# Patient Record
Sex: MALE | Race: WHITE | Employment: FULL TIME | URBAN - METROPOLITAN AREA
[De-identification: names, ages, dates, MRNs, and addresses within clinical notes are randomized per-mention and may not be internally consistent; named-entity substitution may affect disease eponyms.]

---

## 2017-04-27 ENCOUNTER — LAB ENCOUNTER (OUTPATIENT)
Dept: LAB | Age: 62
End: 2017-04-27
Attending: INTERNAL MEDICINE
Payer: COMMERCIAL

## 2017-04-27 ENCOUNTER — PRIOR ORIGINAL RECORDS (OUTPATIENT)
Dept: OTHER | Age: 62
End: 2017-04-27

## 2017-04-27 ENCOUNTER — TELEPHONE (OUTPATIENT)
Dept: INTERNAL MEDICINE CLINIC | Facility: CLINIC | Age: 62
End: 2017-04-27

## 2017-04-27 ENCOUNTER — OFFICE VISIT (OUTPATIENT)
Dept: INTERNAL MEDICINE CLINIC | Facility: CLINIC | Age: 62
End: 2017-04-27

## 2017-04-27 VITALS
BODY MASS INDEX: 30.78 KG/M2 | WEIGHT: 207.81 LBS | HEIGHT: 69 IN | TEMPERATURE: 98 F | HEART RATE: 90 BPM | OXYGEN SATURATION: 95 % | DIASTOLIC BLOOD PRESSURE: 82 MMHG | SYSTOLIC BLOOD PRESSURE: 118 MMHG

## 2017-04-27 DIAGNOSIS — I10 ESSENTIAL HYPERTENSION: ICD-10-CM

## 2017-04-27 DIAGNOSIS — R10.32 LEFT GROIN PAIN: Primary | ICD-10-CM

## 2017-04-27 DIAGNOSIS — Z00.00 ROUTINE HEALTH MAINTENANCE: ICD-10-CM

## 2017-04-27 PROCEDURE — 85025 COMPLETE CBC W/AUTO DIFF WBC: CPT

## 2017-04-27 PROCEDURE — 80061 LIPID PANEL: CPT

## 2017-04-27 PROCEDURE — 80048 BASIC METABOLIC PNL TOTAL CA: CPT

## 2017-04-27 PROCEDURE — 99212 OFFICE O/P EST SF 10 MIN: CPT | Performed by: INTERNAL MEDICINE

## 2017-04-27 PROCEDURE — 36415 COLL VENOUS BLD VENIPUNCTURE: CPT

## 2017-04-27 PROCEDURE — 81003 URINALYSIS AUTO W/O SCOPE: CPT

## 2017-04-27 PROCEDURE — 99214 OFFICE O/P EST MOD 30 MIN: CPT | Performed by: INTERNAL MEDICINE

## 2017-04-27 NOTE — PROGRESS NOTES
Magaly Russell is a 64year old male   HPI:   Pt.presents for the following problems. Patient for 2 months has been having some left upper inner medial thigh pain. Right distal to the groin area.   Seemed to start when he was rock climbing about 2 mo Social History:    Smoking Status: Never Smoker                      Smokeless Status: Never Used                        Alcohol Use: Yes           3.6 - 4.2 oz/week       6-7 Cans of beer per week          REVIEW OF SYSTEMS:   GENERAL:  feels well. hypertension  Update labs. - CBC W Differential W Platelet [E]; Future  - Basic Metabolic Panel (8) [E]; Future  - Lipid Panel [E]; Future  - Urinalysis, Routine [E]; Future    3. Routine health maintenance  Patient has history of colonic polyps.   Next co

## 2017-04-27 NOTE — TELEPHONE ENCOUNTER
Please let patient know that his labs came out acceptable. No changes for now. We will call him with results of his hip x-ray when available.

## 2017-04-27 NOTE — TELEPHONE ENCOUNTER
Called patient and Dr. Daysi Lara message relayed on cell voicemail per HIPAA. Instructed to call back if he had any questions.

## 2017-05-02 ENCOUNTER — HOSPITAL ENCOUNTER (OUTPATIENT)
Dept: GENERAL RADIOLOGY | Age: 62
Discharge: HOME | End: 2017-05-02
Attending: INTERNAL MEDICINE
Payer: COMMERCIAL

## 2017-05-02 ENCOUNTER — TELEPHONE (OUTPATIENT)
Dept: INTERNAL MEDICINE CLINIC | Facility: CLINIC | Age: 62
End: 2017-05-02

## 2017-05-02 DIAGNOSIS — R10.32 LEFT GROIN PAIN: ICD-10-CM

## 2017-05-02 PROCEDURE — 73502 X-RAY EXAM HIP UNI 2-3 VIEWS: CPT

## 2017-05-03 NOTE — TELEPHONE ENCOUNTER
Message relayed to patient who verbalized understanding. Stated he is not interested in seeing orthopedics as of now. Nothing further at this time.

## 2017-05-03 NOTE — TELEPHONE ENCOUNTER
Please let pt know his hip xray came out good. Just minimal arthritic changes not unusual and not causing any pain. He can see orthopedics to see what else can be done.

## 2017-05-09 ENCOUNTER — TELEPHONE (OUTPATIENT)
Dept: INTERNAL MEDICINE CLINIC | Facility: CLINIC | Age: 62
End: 2017-05-09

## 2017-05-09 DIAGNOSIS — R10.32 LEFT GROIN PAIN: Primary | ICD-10-CM

## 2017-05-09 NOTE — TELEPHONE ENCOUNTER
LMTCB   Please advise patient to call his insurance for providers , then  can then give him a referral

## 2017-05-09 NOTE — TELEPHONE ENCOUNTER
Pt called Dr Dmitry Lindquist he can't be seen until mid June, pt would like to get the names of other Orthopedics, please call [phone]      Tasked to nursing

## 2017-05-12 NOTE — TELEPHONE ENCOUNTER
Pt stopped in office with list of orthopedic physicians in [facility] for Dr Ravindra Beckwith review.   Pt asked that Dr Ravindra Beckwith please call with a recommendation   Fam Scherer  Pt ca

## 2017-06-07 ENCOUNTER — TELEPHONE (OUTPATIENT)
Dept: INTERNAL MEDICINE CLINIC | Facility: CLINIC | Age: 62
End: 2017-06-07

## 2017-06-07 NOTE — TELEPHONE ENCOUNTER
Patient has an appt on 6/15, which says 6 mo ck up as he had an appt in December. But patient was also seen on 4/27/17, so does he keep the June appt or do 6 months from April appt?

## 2017-06-08 NOTE — TELEPHONE ENCOUNTER
Called and left Dr. Maik Bautista message on patients cell phone. Notified that we will cancel appt for Jessica 15. Patient to return back in December and to call back to schedule that appointment.     To : please cancel patient appt on Jessica 15,2017, thank

## 2017-06-08 NOTE — TELEPHONE ENCOUNTER
Please notify patient that I think he can wait till December for our next visit since we just saw each other late April.

## 2017-06-27 RX ORDER — PRAVASTATIN SODIUM 20 MG
TABLET ORAL
Qty: 90 TABLET | Refills: 1 | Status: SHIPPED | OUTPATIENT
Start: 2017-06-27 | End: 2018-01-11

## 2017-09-04 RX ORDER — ENALAPRIL MALEATE 5 MG/1
TABLET ORAL
Qty: 90 TABLET | Refills: 3 | Status: SHIPPED | OUTPATIENT
Start: 2017-09-04 | End: 2017-11-28

## 2017-09-11 ENCOUNTER — TELEPHONE (OUTPATIENT)
Dept: INTERNAL MEDICINE CLINIC | Facility: CLINIC | Age: 62
End: 2017-09-11

## 2017-09-11 NOTE — TELEPHONE ENCOUNTER
Pt saw Dr. Emma Bernard but he is not in pt's insurance network [facility]). Pt needs an order for non-invasive MRI.     Tasked to Nursing

## 2017-09-11 NOTE — TELEPHONE ENCOUNTER
To Dr. Shaw Narvaez - pt did see Dr. Zina Burr 1-2 weeks ago - pt was very pleased with Dr. Zina Burr but he is not in pt's network. Pt would like to get the MRI L hip that Dr. Zina Burr suggested - pt asking if it would be more cost effective for you to order it?

## 2017-09-21 NOTE — TELEPHONE ENCOUNTER
Please notify patient that I did talk to .   He told me that I could order the MRI scan for him but then when I was thinking about it more it probably would be wise for him to connect with a new orthopedic physician as the new orthopedic physician m

## 2017-11-06 NOTE — TELEPHONE ENCOUNTER
Patient called back. He would like help finding a new orthopedic doctor. He did see Dr Jose Juan Munoz but did not care for him.  He is wondering if Dr Nicole Mccoy can recommend a doctor to him based on the list he has of those that are in his insurance plan:

## 2017-11-28 ENCOUNTER — OFFICE VISIT (OUTPATIENT)
Dept: INTERNAL MEDICINE CLINIC | Facility: CLINIC | Age: 62
End: 2017-11-28

## 2017-11-28 VITALS
OXYGEN SATURATION: 98 % | TEMPERATURE: 98 F | WEIGHT: 204.63 LBS | SYSTOLIC BLOOD PRESSURE: 140 MMHG | BODY MASS INDEX: 30.31 KG/M2 | DIASTOLIC BLOOD PRESSURE: 86 MMHG | HEIGHT: 69 IN | HEART RATE: 101 BPM

## 2017-11-28 DIAGNOSIS — E78.5 HYPERLIPIDEMIA, UNSPECIFIED HYPERLIPIDEMIA TYPE: ICD-10-CM

## 2017-11-28 DIAGNOSIS — Z11.59 ENCOUNTER FOR HEPATITIS C SCREENING TEST FOR LOW RISK PATIENT: ICD-10-CM

## 2017-11-28 DIAGNOSIS — Z12.5 PROSTATE CANCER SCREENING: ICD-10-CM

## 2017-11-28 DIAGNOSIS — Z00.00 ROUTINE HEALTH MAINTENANCE: ICD-10-CM

## 2017-11-28 DIAGNOSIS — I10 ESSENTIAL HYPERTENSION: Primary | ICD-10-CM

## 2017-11-28 PROCEDURE — 90471 IMMUNIZATION ADMIN: CPT | Performed by: INTERNAL MEDICINE

## 2017-11-28 PROCEDURE — 99212 OFFICE O/P EST SF 10 MIN: CPT | Performed by: INTERNAL MEDICINE

## 2017-11-28 PROCEDURE — 90686 IIV4 VACC NO PRSV 0.5 ML IM: CPT | Performed by: INTERNAL MEDICINE

## 2017-11-28 PROCEDURE — 99214 OFFICE O/P EST MOD 30 MIN: CPT | Performed by: INTERNAL MEDICINE

## 2017-11-28 RX ORDER — ENALAPRIL MALEATE 5 MG/1
TABLET ORAL
Qty: 90 TABLET | Refills: 3 | COMMUNITY
Start: 2017-11-28 | End: 2018-01-23

## 2017-11-28 NOTE — PROGRESS NOTES
Name and  verified. Consent form completed flulaval administered left deltoid. Patient tolerated w/o complaint. No adverse reactions noted. VIS given to patient.

## 2017-11-28 NOTE — PROGRESS NOTES
Marilyn Lucas is a 58year old male. HPI:   Patient presents with:  Blood Pressure: BP has been elevated the last 2 weeks        Patient has hypertension. On enalapril 5 mg a day. Tolerating well. He went to his dentist about a month ago.   Blood BMI 30.21 kg/m²     GENERAL:  well developed, well nourished, in no apparent distress  SKIN:  no rashes   HEENT: atraumatic. Pharynx normal without exudate. EYES:  PERRL. Sclera anicteric.   NECK:  Supple,  no adenopathy,  thyroid normal  LUNGS:  clear

## 2017-12-01 ENCOUNTER — PRIOR ORIGINAL RECORDS (OUTPATIENT)
Dept: OTHER | Age: 62
End: 2017-12-01

## 2017-12-01 LAB
BUN: 14 MG/DL
CALCIUM: 9.3 MG/DL
CHLORIDE: 105 MEQ/L
CHOLESTEROL, TOTAL: 174 MG/DL
CREATININE, SERUM: 1.02 MG/DL
GLUCOSE: 103 MG/DL
GLUCOSE: 103 MG/DL
HDL CHOLESTEROL: 56 MG/DL
HEMATOCRIT: 42.3 %
HEMOGLOBIN: 14.1 G/DL
LDL CHOLESTEROL: 104 MG/DL
NON-HDL CHOLESTEROL: 118 MG/DL
PLATELETS: 274 K/UL
POTASSIUM, SERUM: 4.4 MEQ/L
RED BLOOD COUNT: 4.82 X 10-6/U
SODIUM: 140 MEQ/L
TRIGLYCERIDES: 72 MG/DL
WHITE BLOOD COUNT: 6.4 X 10-3/U

## 2017-12-11 PROBLEM — R10.32 LEFT GROIN PAIN: Status: ACTIVE | Noted: 2017-12-11

## 2017-12-28 ENCOUNTER — APPOINTMENT (OUTPATIENT)
Dept: LAB | Age: 62
End: 2017-12-28
Attending: INTERNAL MEDICINE
Payer: COMMERCIAL

## 2017-12-28 DIAGNOSIS — Z11.59 ENCOUNTER FOR HEPATITIS C SCREENING TEST FOR LOW RISK PATIENT: ICD-10-CM

## 2017-12-28 DIAGNOSIS — I10 ESSENTIAL HYPERTENSION: ICD-10-CM

## 2017-12-28 DIAGNOSIS — Z12.5 PROSTATE CANCER SCREENING: ICD-10-CM

## 2017-12-28 LAB
ALBUMIN SERPL BCP-MCNC: 4.2 G/DL (ref 3.5–4.8)
ALBUMIN/GLOB SERPL: 1.4 {RATIO} (ref 1–2)
ALP SERPL-CCNC: 53 U/L (ref 32–100)
ALT SERPL-CCNC: 23 U/L (ref 17–63)
ANION GAP SERPL CALC-SCNC: 9 MMOL/L (ref 0–18)
AST SERPL-CCNC: 23 U/L (ref 15–41)
BILIRUB SERPL-MCNC: 0.6 MG/DL (ref 0.3–1.2)
BUN SERPL-MCNC: 15 MG/DL (ref 8–20)
BUN/CREAT SERPL: 14.4 (ref 10–20)
CALCIUM SERPL-MCNC: 9.1 MG/DL (ref 8.5–10.5)
CHLORIDE SERPL-SCNC: 103 MMOL/L (ref 95–110)
CHOLEST SERPL-MCNC: 173 MG/DL (ref 110–200)
CO2 SERPL-SCNC: 26 MMOL/L (ref 22–32)
CREAT SERPL-MCNC: 1.04 MG/DL (ref 0.5–1.5)
GLOBULIN PLAS-MCNC: 3 G/DL (ref 2.5–3.7)
GLUCOSE SERPL-MCNC: 94 MG/DL (ref 70–99)
HDLC SERPL-MCNC: 55 MG/DL
LDLC SERPL CALC-MCNC: 96 MG/DL (ref 0–99)
NONHDLC SERPL-MCNC: 118 MG/DL
OSMOLALITY UR CALC.SUM OF ELEC: 287 MOSM/KG (ref 275–295)
POTASSIUM SERPL-SCNC: 4 MMOL/L (ref 3.3–5.1)
PROT SERPL-MCNC: 7.2 G/DL (ref 5.9–8.4)
PSA SERPL-MCNC: 1.5 NG/ML (ref 0–4)
SODIUM SERPL-SCNC: 138 MMOL/L (ref 136–144)
TRIGL SERPL-MCNC: 111 MG/DL (ref 1–149)

## 2017-12-28 PROCEDURE — 80061 LIPID PANEL: CPT

## 2017-12-28 PROCEDURE — 36415 COLL VENOUS BLD VENIPUNCTURE: CPT

## 2017-12-28 PROCEDURE — 86803 HEPATITIS C AB TEST: CPT

## 2017-12-28 PROCEDURE — 80053 COMPREHEN METABOLIC PANEL: CPT

## 2017-12-29 LAB — HCV AB SERPL QL IA: NONREACTIVE

## 2018-01-01 ENCOUNTER — TELEPHONE (OUTPATIENT)
Dept: INTERNAL MEDICINE CLINIC | Facility: CLINIC | Age: 63
End: 2018-01-01

## 2018-01-01 NOTE — TELEPHONE ENCOUNTER
On call phone call--  BP today 172/102 and 186/96. Takes enalapril 10 mg daily. Take enalapril 5 mg now and repeat 5 mg in 3 hrs if still over 751 systolic. Has appt to see Dr. Karoline Cao in 3 days.  Rec he call office tomorrow w updated readings, sooner if

## 2018-01-04 ENCOUNTER — TELEPHONE (OUTPATIENT)
Dept: INTERNAL MEDICINE CLINIC | Facility: CLINIC | Age: 63
End: 2018-01-04

## 2018-01-04 ENCOUNTER — OFFICE VISIT (OUTPATIENT)
Dept: INTERNAL MEDICINE CLINIC | Facility: CLINIC | Age: 63
End: 2018-01-04

## 2018-01-04 VITALS
BODY MASS INDEX: 29.59 KG/M2 | DIASTOLIC BLOOD PRESSURE: 72 MMHG | SYSTOLIC BLOOD PRESSURE: 132 MMHG | OXYGEN SATURATION: 98 % | HEART RATE: 108 BPM | HEIGHT: 69 IN | TEMPERATURE: 99 F | WEIGHT: 199.81 LBS

## 2018-01-04 DIAGNOSIS — I10 ESSENTIAL HYPERTENSION: Primary | ICD-10-CM

## 2018-01-04 DIAGNOSIS — E78.5 HYPERLIPIDEMIA, UNSPECIFIED HYPERLIPIDEMIA TYPE: ICD-10-CM

## 2018-01-04 DIAGNOSIS — R10.32 LEFT GROIN PAIN: ICD-10-CM

## 2018-01-04 DIAGNOSIS — Z00.00 ROUTINE HEALTH MAINTENANCE: ICD-10-CM

## 2018-01-04 PROCEDURE — 99212 OFFICE O/P EST SF 10 MIN: CPT | Performed by: INTERNAL MEDICINE

## 2018-01-04 PROCEDURE — 99214 OFFICE O/P EST MOD 30 MIN: CPT | Performed by: INTERNAL MEDICINE

## 2018-01-04 NOTE — TELEPHONE ENCOUNTER
As FYI to DR. KRISHNAN - called DR. Posadas office , spoke with Mona Patel and relayed DR. MASON message - she will fax requested notes over - Asl[facility] Parents will be looking for incoming fax

## 2018-01-04 NOTE — TELEPHONE ENCOUNTER
Please ask Dr. Dayana Junior's office at [phone] or [phone] for his office visit notes and MRI of the hip or any other radiological studies that patient may have done for our records. Thank you.

## 2018-01-11 RX ORDER — PRAVASTATIN SODIUM 20 MG
TABLET ORAL
Qty: 90 TABLET | Refills: 3 | Status: SHIPPED | OUTPATIENT
Start: 2018-01-11 | End: 2019-01-14

## 2018-01-23 ENCOUNTER — TELEPHONE (OUTPATIENT)
Dept: INTERNAL MEDICINE CLINIC | Facility: CLINIC | Age: 63
End: 2018-01-23

## 2018-01-23 ENCOUNTER — OFFICE VISIT (OUTPATIENT)
Dept: INTERNAL MEDICINE CLINIC | Facility: CLINIC | Age: 63
End: 2018-01-23

## 2018-01-23 VITALS
TEMPERATURE: 98 F | DIASTOLIC BLOOD PRESSURE: 80 MMHG | BODY MASS INDEX: 29.42 KG/M2 | HEART RATE: 103 BPM | SYSTOLIC BLOOD PRESSURE: 130 MMHG | WEIGHT: 198.63 LBS | OXYGEN SATURATION: 98 % | HEIGHT: 69 IN

## 2018-01-23 DIAGNOSIS — J06.9 ACUTE UPPER RESPIRATORY INFECTION, UNSPECIFIED: Primary | ICD-10-CM

## 2018-01-23 DIAGNOSIS — I10 ESSENTIAL HYPERTENSION: ICD-10-CM

## 2018-01-23 PROCEDURE — 99212 OFFICE O/P EST SF 10 MIN: CPT | Performed by: INTERNAL MEDICINE

## 2018-01-23 PROCEDURE — 99213 OFFICE O/P EST LOW 20 MIN: CPT | Performed by: INTERNAL MEDICINE

## 2018-01-23 RX ORDER — PROMETHAZINE HYDROCHLORIDE AND CODEINE PHOSPHATE 6.25; 1 MG/5ML; MG/5ML
2.5 SYRUP ORAL EVERY 4 HOURS PRN
Qty: 180 ML | Refills: 0 | Status: SHIPPED | OUTPATIENT
Start: 2018-01-23 | End: 2018-02-05 | Stop reason: ALTCHOICE

## 2018-01-23 RX ORDER — AZITHROMYCIN 250 MG/1
TABLET, FILM COATED ORAL
Qty: 6 TABLET | Refills: 0 | Status: SHIPPED | OUTPATIENT
Start: 2018-01-23 | End: 2018-02-05 | Stop reason: ALTCHOICE

## 2018-01-23 RX ORDER — ENALAPRIL MALEATE 10 MG/1
10 TABLET ORAL EVERY MORNING
Qty: 90 TABLET | Refills: 3 | Status: SHIPPED | OUTPATIENT
Start: 2018-01-23 | End: 2018-01-23

## 2018-01-23 RX ORDER — ENALAPRIL MALEATE 10 MG/1
10 TABLET ORAL EVERY MORNING
Qty: 90 TABLET | Refills: 3 | Status: SHIPPED | OUTPATIENT
Start: 2018-01-23 | End: 2019-04-18

## 2018-01-23 NOTE — PROGRESS NOTES
Anne Boyer is a 58year old male. HPI:   Patient presents with:  Cough: productive cough, lethargy,  chills x 5 days       As per nursing documentation. Last Friday patient started with coughing. He was in bed for Friday Saturday and Sunday.   He lb 9.6 oz (90.1 kg)   SpO2 98%   BMI 29.33 kg/m²     GENERAL:  well developed, well nourished, in no apparent distress  SKIN:  no rashes , no suspicious lesions  HEENT: atraumatic. TM's normal. Canals clear. Pharynx normal without exudate. EYES:  PERRL.

## 2018-02-05 ENCOUNTER — OFFICE VISIT (OUTPATIENT)
Dept: DERMATOLOGY CLINIC | Facility: CLINIC | Age: 63
End: 2018-02-05

## 2018-02-05 DIAGNOSIS — L71.9 ROSACEA: Primary | ICD-10-CM

## 2018-02-05 DIAGNOSIS — L73.1 INGROWN HAIR: ICD-10-CM

## 2018-02-05 PROCEDURE — 99212 OFFICE O/P EST SF 10 MIN: CPT | Performed by: DERMATOLOGY

## 2018-02-05 PROCEDURE — 99202 OFFICE O/P NEW SF 15 MIN: CPT | Performed by: DERMATOLOGY

## 2018-02-05 RX ORDER — SULFACETAMIDE SODIUM 100 MG/ML
1 LOTION TOPICAL DAILY
Qty: 1 BOTTLE | Refills: 3 | Status: SHIPPED | OUTPATIENT
Start: 2018-02-05 | End: 2020-01-30 | Stop reason: ALTCHOICE

## 2018-02-05 RX ORDER — CLINDAMYCIN PHOSPHATE 10 MG/ML
1 SOLUTION TOPICAL DAILY
Qty: 60 EACH | Refills: 3 | Status: SHIPPED | OUTPATIENT
Start: 2018-02-05 | End: 2018-07-26 | Stop reason: CLARIF

## 2018-02-05 RX ORDER — DOXYCYCLINE HYCLATE 100 MG/1
100 CAPSULE ORAL DAILY
Qty: 30 CAPSULE | Refills: 2 | Status: SHIPPED | OUTPATIENT
Start: 2018-02-05 | End: 2018-07-26 | Stop reason: CLARIF

## 2018-02-05 NOTE — PROGRESS NOTES
Past Medical History:   Diagnosis Date   • Adenomatous colon polyp 6/07   • Essential hypertension    • Other and unspecified hyperlipidemia      Past Surgical History:  10/10, 6/07: COLONOSCOPY  6/16/2016: COLONOSCOPY,BIOPSY N/A      Comment: Procedure: C

## 2018-02-05 NOTE — PROGRESS NOTES
HP I:     Chief Complaint     Lesion        HPI     Lesion    Additional comments: New pt. Pt presents with concern of multiple lesions to right cheek.   Pt notes that he has multiple recurrent \"ingrown hairs\" that wax and wane        Last edited by [employer] COLONOSCOPY,BIOPSY N/A      Comment: Procedure: COLONOSCOPY, POSSIBLE BIOPSY,                POSSIBLE POLYPECTOMY 98231;  Surgeon: Jimmy Keneny MD;  Location: [address] [place] [address]    Social History  Social History   Mar this or any previous visit (from the past 48 hour(s)). Meds This Visit:      Imaging Orders:  None     Referral Orders:  No orders of the defined types were placed in this encounter.         2/5/2018  Sheryl Agee

## 2018-03-20 ENCOUNTER — OFFICE VISIT (OUTPATIENT)
Dept: DERMATOLOGY CLINIC | Facility: CLINIC | Age: 63
End: 2018-03-20

## 2018-03-20 DIAGNOSIS — D18.01 HEMANGIOMA OF SKIN AND SUBCUTANEOUS TISSUE: ICD-10-CM

## 2018-03-20 DIAGNOSIS — L71.9 ROSACEA: Primary | ICD-10-CM

## 2018-03-20 DIAGNOSIS — L73.1 INGROWN HAIR: ICD-10-CM

## 2018-03-20 PROCEDURE — 99213 OFFICE O/P EST LOW 20 MIN: CPT | Performed by: DERMATOLOGY

## 2018-03-20 PROCEDURE — 99212 OFFICE O/P EST SF 10 MIN: CPT | Performed by: DERMATOLOGY

## 2018-03-20 RX ORDER — IBUPROFEN 600 MG/1
TABLET ORAL
COMMUNITY
Start: 2018-01-10 | End: 2018-10-08

## 2018-03-20 RX ORDER — CLINDAMYCIN HYDROCHLORIDE 300 MG/1
CAPSULE ORAL
COMMUNITY
Start: 2018-01-10 | End: 2018-07-26 | Stop reason: CLARIF

## 2018-03-20 NOTE — PROGRESS NOTES
HPI:     Chief Complaint     Rosacea        HPI     Rosacea    Additional comments: LOV: 02/05/18. Pt presents with f/u rosacea. Pt was given Doxyxycline and Clindamycin and Klaron lotion. Pt states condition is improving.         Last edited by Josue Smiley hyperlipidemia      Past Surgical History:  10/10, 6/07: COLONOSCOPY  6/16/2016: COLONOSCOPY,BIOPSY N/A      Comment: Procedure: COLONOSCOPY, POSSIBLE BIOPSY,                POSSIBLE POLYPECTOMY 23556;  Surgeon: Denisse Rivas MD;  Location orders of the defined types were placed in this encounter. Results From Past 48 Hours:  No results found for this or any previous visit (from the past 48 hour(s)).     Meds This Visit:      Imaging Orders:  None     Referral Orders:  No orders of the d

## 2018-07-26 ENCOUNTER — OFFICE VISIT (OUTPATIENT)
Dept: INTERNAL MEDICINE CLINIC | Facility: CLINIC | Age: 63
End: 2018-07-26
Payer: COMMERCIAL

## 2018-07-26 ENCOUNTER — TELEPHONE (OUTPATIENT)
Dept: INTERNAL MEDICINE CLINIC | Facility: CLINIC | Age: 63
End: 2018-07-26

## 2018-07-26 VITALS
HEIGHT: 69 IN | TEMPERATURE: 99 F | BODY MASS INDEX: 30.57 KG/M2 | WEIGHT: 206.38 LBS | OXYGEN SATURATION: 99 % | HEART RATE: 95 BPM | DIASTOLIC BLOOD PRESSURE: 70 MMHG | SYSTOLIC BLOOD PRESSURE: 126 MMHG

## 2018-07-26 DIAGNOSIS — E78.5 HYPERLIPIDEMIA, UNSPECIFIED HYPERLIPIDEMIA TYPE: ICD-10-CM

## 2018-07-26 DIAGNOSIS — I10 ESSENTIAL HYPERTENSION: Primary | ICD-10-CM

## 2018-07-26 DIAGNOSIS — Z00.00 ROUTINE HEALTH MAINTENANCE: ICD-10-CM

## 2018-07-26 DIAGNOSIS — R10.32 LEFT GROIN PAIN: ICD-10-CM

## 2018-07-26 PROBLEM — Z86.0100 HISTORY OF COLON POLYPS: Status: ACTIVE | Noted: 2018-07-26

## 2018-07-26 PROBLEM — Z86.010 HISTORY OF COLON POLYPS: Status: ACTIVE | Noted: 2018-07-26

## 2018-07-26 PROCEDURE — 99212 OFFICE O/P EST SF 10 MIN: CPT | Performed by: INTERNAL MEDICINE

## 2018-07-26 PROCEDURE — 99214 OFFICE O/P EST MOD 30 MIN: CPT | Performed by: INTERNAL MEDICINE

## 2018-07-26 NOTE — TELEPHONE ENCOUNTER
Less than 3 polyps, each under 1 cm with no villous histology is a 5 year follow up.  3-9 polyps or one at least 1 cm or villous histology is generally a 3 year follow up (unless it is a large spreading sessile polyp that might require an even shorter inte

## 2018-07-26 NOTE — PROGRESS NOTES
Manuel Rojas is a 61year old male   HPI:   Pt.presents for the following problems. Patient feels well. He has no acute complaints. For about a year and a half he has had left groin area pain. He was checked for hernia by Dr. Loris Opitz.   His visit 20 MG Oral Tab TAKE 1 TABLET BY MOUTH DAILY. Disp: 90 tablet Rfl: 3   aspirin 81 MG Oral Tab Take 81 mg by mouth daily.  Disp:  Rfl:       Past Medical History:   Diagnosis Date   • Adenomatous colon polyp 6/07   • Essential hypertension    • Other and unsp thyroid normal,  LUNGS:  clear to auscultation. effort normal  CARDIO:  RRR without murmur. S1 normal S2 normal.  GI:  good BS's,  no masses,  HSM or tenderness  RECTAL: Next colonoscopy June 2019.   Offered repeat  exam for his left groin pain but patie

## 2018-07-26 NOTE — TELEPHONE ENCOUNTER
Benjamin RoldanBrian Douglass had a colonoscopy with Dr. Sumanth Krishnamurthy June 2016 showing 2 polyps,  One was a tubular villous adenoma and another tubular adenoma. Pathology noted in [facility] June 16, 2016. If you would be so kind to let me know when next colonoscopy would be due.

## 2018-10-03 ENCOUNTER — LAB ENCOUNTER (OUTPATIENT)
Dept: LAB | Age: 63
End: 2018-10-03
Attending: INTERNAL MEDICINE
Payer: COMMERCIAL

## 2018-10-03 ENCOUNTER — PRIOR ORIGINAL RECORDS (OUTPATIENT)
Dept: OTHER | Age: 63
End: 2018-10-03

## 2018-10-03 ENCOUNTER — TELEPHONE (OUTPATIENT)
Dept: INTERNAL MEDICINE CLINIC | Facility: CLINIC | Age: 63
End: 2018-10-03

## 2018-10-03 DIAGNOSIS — I10 ESSENTIAL HYPERTENSION: ICD-10-CM

## 2018-10-03 PROCEDURE — 80048 BASIC METABOLIC PNL TOTAL CA: CPT

## 2018-10-03 PROCEDURE — 36415 COLL VENOUS BLD VENIPUNCTURE: CPT

## 2018-10-03 PROCEDURE — 80061 LIPID PANEL: CPT

## 2018-10-03 PROCEDURE — 85025 COMPLETE CBC W/AUTO DIFF WBC: CPT

## 2018-10-08 ENCOUNTER — OFFICE VISIT (OUTPATIENT)
Dept: INTERNAL MEDICINE CLINIC | Facility: CLINIC | Age: 63
End: 2018-10-08
Payer: COMMERCIAL

## 2018-10-08 VITALS
HEART RATE: 102 BPM | WEIGHT: 204 LBS | SYSTOLIC BLOOD PRESSURE: 140 MMHG | TEMPERATURE: 99 F | BODY MASS INDEX: 30.21 KG/M2 | DIASTOLIC BLOOD PRESSURE: 78 MMHG | OXYGEN SATURATION: 97 % | HEIGHT: 69 IN

## 2018-10-08 DIAGNOSIS — I10 ESSENTIAL HYPERTENSION: Primary | ICD-10-CM

## 2018-10-08 DIAGNOSIS — Z00.00 ROUTINE HEALTH MAINTENANCE: ICD-10-CM

## 2018-10-08 PROCEDURE — 99212 OFFICE O/P EST SF 10 MIN: CPT | Performed by: INTERNAL MEDICINE

## 2018-10-08 PROCEDURE — 99214 OFFICE O/P EST MOD 30 MIN: CPT | Performed by: INTERNAL MEDICINE

## 2018-10-08 NOTE — PROGRESS NOTES
Serafin Jensen is a 61year old male. HPI:   Patient presents with:  Blood Pressure: elevated BP at the dentist office       Patient relates that when he goes to the dentist his blood pressures are high.   His last blood pressure at the dentist was 180/ use: Yes      Alcohol/week: 3.6 - 4.2 oz      Types: 6 - 7 Cans of beer per week      Comment: 2-3 drinks weekly    Drug use: No       REVIEW OF SYSTEMS:   GENERAL HEALTH:  feels well otherwise  RESPIRATORY:  Voices no shortness of breath with exertion or

## 2018-10-09 ENCOUNTER — TELEPHONE (OUTPATIENT)
Dept: INTERNAL MEDICINE CLINIC | Facility: CLINIC | Age: 63
End: 2018-10-09

## 2018-10-09 NOTE — TELEPHONE ENCOUNTER
Pt is calling to give Dr Asaf Almanzar the phone number for his dentist Dr Karina Severino #[phone]   Tasked to nursing

## 2018-10-13 NOTE — TELEPHONE ENCOUNTER
Please let patient know I did speak to his dentist Dr. Hoda Leroy. He said it sounds like a good idea to have his home BP machine checked with the dentists wrist BP machine to see how close the readings come out. Manjeet Gardner

## 2018-10-15 NOTE — TELEPHONE ENCOUNTER
Called and relayed MDs message on pts vm. instructed him to bring his home bp machine to next dentist appt to compare values.

## 2018-10-17 RX ORDER — ENALAPRIL MALEATE 10 MG/1
TABLET ORAL
Qty: 90 TABLET | Refills: 2 | OUTPATIENT
Start: 2018-10-17

## 2019-01-04 LAB
BUN: 11 MG/DL
CALCIUM: 9 MG/DL
CHLORIDE: 104 MEQ/L
CHOLESTEROL, TOTAL: 153 MG/DL
CREATININE, SERUM: 0.93 MG/DL
GLUCOSE: 89 MG/DL
GLUCOSE: 89 MG/DL
HDL CHOLESTEROL: 36 MG/DL
HEMATOCRIT: 40.7 %
HEMOGLOBIN: 13.8 G/DL
LDL CHOLESTEROL: 94 MG/DL
NON-HDL CHOLESTEROL: 117 MG/DL
PLATELETS: 293 K/UL
POTASSIUM, SERUM: 4.5 MEQ/L
RED BLOOD COUNT: 4.66 X 10-6/U
SODIUM: 139 MEQ/L
TRIGLYCERIDES: 114 MG/DL
WHITE BLOOD COUNT: 5.1 X 10-3/U

## 2019-01-08 ENCOUNTER — MYAURORA ACCOUNT LINK (OUTPATIENT)
Dept: OTHER | Age: 64
End: 2019-01-08

## 2019-01-08 ENCOUNTER — PRIOR ORIGINAL RECORDS (OUTPATIENT)
Dept: OTHER | Age: 64
End: 2019-01-08

## 2019-01-14 ENCOUNTER — TELEPHONE (OUTPATIENT)
Dept: INTERNAL MEDICINE CLINIC | Facility: CLINIC | Age: 64
End: 2019-01-14

## 2019-01-14 RX ORDER — PRAVASTATIN SODIUM 20 MG
20 TABLET ORAL
Qty: 90 TABLET | Refills: 3 | Status: SHIPPED | OUTPATIENT
Start: 2019-01-14 | End: 2020-01-19

## 2019-02-11 ENCOUNTER — TELEPHONE (OUTPATIENT)
Dept: INTERNAL MEDICINE CLINIC | Facility: CLINIC | Age: 64
End: 2019-02-11

## 2019-02-11 ENCOUNTER — OFFICE VISIT (OUTPATIENT)
Dept: INTERNAL MEDICINE CLINIC | Facility: CLINIC | Age: 64
End: 2019-02-11
Payer: COMMERCIAL

## 2019-02-11 VITALS
SYSTOLIC BLOOD PRESSURE: 144 MMHG | OXYGEN SATURATION: 99 % | HEART RATE: 104 BPM | TEMPERATURE: 98 F | WEIGHT: 212.38 LBS | DIASTOLIC BLOOD PRESSURE: 80 MMHG | BODY MASS INDEX: 31.45 KG/M2 | HEIGHT: 69 IN

## 2019-02-11 DIAGNOSIS — E78.5 HYPERLIPIDEMIA, UNSPECIFIED HYPERLIPIDEMIA TYPE: ICD-10-CM

## 2019-02-11 DIAGNOSIS — I10 ESSENTIAL HYPERTENSION: Primary | ICD-10-CM

## 2019-02-11 DIAGNOSIS — Z00.00 ROUTINE HEALTH MAINTENANCE: ICD-10-CM

## 2019-02-11 DIAGNOSIS — G89.29 CHRONIC LEFT HIP PAIN: ICD-10-CM

## 2019-02-11 DIAGNOSIS — R10.32 LEFT GROIN PAIN: ICD-10-CM

## 2019-02-11 DIAGNOSIS — M25.552 CHRONIC LEFT HIP PAIN: ICD-10-CM

## 2019-02-11 PROCEDURE — 99214 OFFICE O/P EST MOD 30 MIN: CPT | Performed by: INTERNAL MEDICINE

## 2019-02-11 PROCEDURE — 99212 OFFICE O/P EST SF 10 MIN: CPT | Performed by: INTERNAL MEDICINE

## 2019-02-11 NOTE — TELEPHONE ENCOUNTER
As CELINE bowman DR/ARIELLA - called DR. Junior office , spoke with Nia Pascal who will fax over last office note , no MRI or x-rays were done

## 2019-02-11 NOTE — PROGRESS NOTES
Chris Alexandra is a 61year old male   HPI:   Pt.presents for the following problems. Patient has hypertension. Blood pressure under satisfactory control for now. Patient did have an unremarkable stress echo with Dr. Blanco Keto January 4, 2019.   At alfonso 10 % External Lotion Apply 1 Application topically daily. Disp: 1 Bottle Rfl: 3   Enalapril Maleate 10 MG Oral Tab Take 1 tablet (10 mg total) by mouth every morning. Disp: 90 tablet Rfl: 3   aspirin 81 MG Oral Tab Take 81 mg by mouth daily.  Disp:  Rfl: erythema. EYES;  PERRL. conjunctiva are clear  NECK:  Supple. no adenopathy,   LUNGS:  clear to auscultation. effort normal  CARDIO:  RRR without murmur.   S1 normal S2 normal.   GI:  good BS's,  no masses,  HSM or tenderness  :  two descended normal  t

## 2019-02-11 NOTE — TELEPHONE ENCOUNTER
Please asked Dr. Bre Juárez office for last consult note along with any reports of MRIs or x-rays he may have.

## 2019-02-28 VITALS
HEIGHT: 69 IN | SYSTOLIC BLOOD PRESSURE: 150 MMHG | BODY MASS INDEX: 30.21 KG/M2 | HEART RATE: 100 BPM | DIASTOLIC BLOOD PRESSURE: 60 MMHG | WEIGHT: 204 LBS | OXYGEN SATURATION: 96 %

## 2019-02-28 VITALS
BODY MASS INDEX: 30.21 KG/M2 | WEIGHT: 204 LBS | DIASTOLIC BLOOD PRESSURE: 78 MMHG | HEIGHT: 69 IN | OXYGEN SATURATION: 97 % | SYSTOLIC BLOOD PRESSURE: 122 MMHG | HEART RATE: 94 BPM

## 2019-03-21 PROCEDURE — 88305 TISSUE EXAM BY PATHOLOGIST: CPT | Performed by: INTERNAL MEDICINE

## 2019-04-02 ENCOUNTER — TELEPHONE (OUTPATIENT)
Dept: CARDIOLOGY | Age: 64
End: 2019-04-02

## 2019-04-02 ENCOUNTER — OFFICE VISIT (OUTPATIENT)
Dept: INTERNAL MEDICINE CLINIC | Facility: CLINIC | Age: 64
End: 2019-04-02
Payer: COMMERCIAL

## 2019-04-02 VITALS
HEART RATE: 96 BPM | DIASTOLIC BLOOD PRESSURE: 88 MMHG | TEMPERATURE: 98 F | OXYGEN SATURATION: 98 % | SYSTOLIC BLOOD PRESSURE: 138 MMHG | BODY MASS INDEX: 31.43 KG/M2 | HEIGHT: 69 IN | WEIGHT: 212.19 LBS

## 2019-04-02 DIAGNOSIS — D68.59 HYPERCOAGULABLE STATE (HCC): ICD-10-CM

## 2019-04-02 DIAGNOSIS — R82.90 ABNORMAL URINALYSIS: ICD-10-CM

## 2019-04-02 DIAGNOSIS — R10.32 LEFT GROIN PAIN: ICD-10-CM

## 2019-04-02 DIAGNOSIS — Z01.818 PRE-OP EVALUATION: Primary | ICD-10-CM

## 2019-04-02 DIAGNOSIS — E78.5 HYPERLIPIDEMIA, UNSPECIFIED HYPERLIPIDEMIA TYPE: ICD-10-CM

## 2019-04-02 DIAGNOSIS — Z12.5 SCREENING FOR PROSTATE CANCER: ICD-10-CM

## 2019-04-02 DIAGNOSIS — M25.552 LEFT HIP PAIN: ICD-10-CM

## 2019-04-02 DIAGNOSIS — I10 ESSENTIAL HYPERTENSION: ICD-10-CM

## 2019-04-02 PROCEDURE — 99212 OFFICE O/P EST SF 10 MIN: CPT | Performed by: INTERNAL MEDICINE

## 2019-04-02 PROCEDURE — 93005 ELECTROCARDIOGRAM TRACING: CPT | Performed by: INTERNAL MEDICINE

## 2019-04-02 PROCEDURE — 99214 OFFICE O/P EST MOD 30 MIN: CPT | Performed by: INTERNAL MEDICINE

## 2019-04-02 PROCEDURE — 93010 ELECTROCARDIOGRAM REPORT: CPT | Performed by: INTERNAL MEDICINE

## 2019-04-02 NOTE — PROGRESS NOTES
Lizbeth Collier is a 61year old male   HPI:   Pt.presents for the following problems. Patient here for preop evaluation. He will be going for left total hip replacement April 30 with . He feels well.   He had a stress echo in January [facility] Tab Take 1 tablet (10 mg total) by mouth every morning. Disp: 90 tablet Rfl: 3   aspirin 81 MG Oral Tab Take 81 mg by mouth daily.  Disp:  Rfl:       Past Medical History:   Diagnosis Date   • Adenomatous colon polyp 6/07   • Essential hypertension    • Oth conjunctiva are clear. Patient with a history of lazy left eye. NECK:  Supple. no adenopathy,  thyroid normal,  LUNGS:  clear to auscultation. effort normal  CARDIO:  RRR without murmur.   S1 normal S2 normal.   GI:  good BS's,  no masses,  HSM or tender

## 2019-04-04 ENCOUNTER — TELEPHONE (OUTPATIENT)
Dept: INTERNAL MEDICINE CLINIC | Facility: CLINIC | Age: 64
End: 2019-04-04

## 2019-04-04 ENCOUNTER — HOSPITAL ENCOUNTER (OUTPATIENT)
Dept: GENERAL RADIOLOGY | Age: 64
Discharge: HOME | End: 2019-04-04
Attending: INTERNAL MEDICINE
Payer: COMMERCIAL

## 2019-04-04 ENCOUNTER — LAB ENCOUNTER (OUTPATIENT)
Dept: LAB | Age: 64
End: 2019-04-04
Attending: INTERNAL MEDICINE
Payer: COMMERCIAL

## 2019-04-04 DIAGNOSIS — I10 ESSENTIAL HYPERTENSION: ICD-10-CM

## 2019-04-04 DIAGNOSIS — Z12.5 SCREENING FOR PROSTATE CANCER: ICD-10-CM

## 2019-04-04 DIAGNOSIS — R82.90 ABNORMAL URINALYSIS: ICD-10-CM

## 2019-04-04 DIAGNOSIS — D68.59 HYPERCOAGULABLE STATE (HCC): ICD-10-CM

## 2019-04-04 DIAGNOSIS — Z01.818 PRE-OP EVALUATION: ICD-10-CM

## 2019-04-04 LAB
ALBUMIN SERPL-MCNC: 4.1 G/DL
ALBUMIN/GLOB SERPL: NORMAL {RATIO}
ALP SERPL-CCNC: 59 U/L
ALT SERPL-CCNC: 35 U/L
ANION GAP SERPL CALC-SCNC: NORMAL MMOL/L
AST SERPL-CCNC: 24 U/L
BILIRUB SERPL-MCNC: 0.5 MG/DL
BUN SERPL-MCNC: 17 MG/DL
BUN/CREAT SERPL: NORMAL
CALCIUM SERPL-MCNC: 9.5 MG/DL
CHLORIDE SERPL-SCNC: 104 MMOL/L
CHOLEST SERPL-MCNC: 224 MG/DL
CHOLEST/HDLC SERPL: NORMAL {RATIO}
CO2 SERPL-SCNC: NORMAL MMOL/L
CREAT SERPL-MCNC: 1.05 MG/DL
GLOBULIN SER-MCNC: 3.7 G/DL
GLUCOSE SERPL-MCNC: 98 MG/DL
HDLC SERPL-MCNC: 55 MG/DL
LDLC SERPL CALC-MCNC: 146 MG/DL
LENGTH OF FAST TIME PATIENT: NORMAL H
LENGTH OF FAST TIME PATIENT: NORMAL H
NONHDLC SERPL-MCNC: 169 MG/DL
POTASSIUM SERPL-SCNC: 4.3 MMOL/L
PROT SERPL-MCNC: 7.8 G/DL
SODIUM SERPL-SCNC: 138 MMOL/L
TRIGL SERPL-MCNC: 113 MG/DL
VLDLC SERPL CALC-MCNC: NORMAL MG/DL

## 2019-04-04 PROCEDURE — 86900 BLOOD TYPING SEROLOGIC ABO: CPT

## 2019-04-04 PROCEDURE — 85730 THROMBOPLASTIN TIME PARTIAL: CPT

## 2019-04-04 PROCEDURE — 87086 URINE CULTURE/COLONY COUNT: CPT

## 2019-04-04 PROCEDURE — 87640 STAPH A DNA AMP PROBE: CPT

## 2019-04-04 PROCEDURE — 81003 URINALYSIS AUTO W/O SCOPE: CPT

## 2019-04-04 PROCEDURE — 80061 LIPID PANEL: CPT

## 2019-04-04 PROCEDURE — 36415 COLL VENOUS BLD VENIPUNCTURE: CPT

## 2019-04-04 PROCEDURE — 87641 MR-STAPH DNA AMP PROBE: CPT

## 2019-04-04 PROCEDURE — 85025 COMPLETE CBC W/AUTO DIFF WBC: CPT

## 2019-04-04 PROCEDURE — 80053 COMPREHEN METABOLIC PANEL: CPT

## 2019-04-04 PROCEDURE — 86901 BLOOD TYPING SEROLOGIC RH(D): CPT

## 2019-04-04 PROCEDURE — 71046 X-RAY EXAM CHEST 2 VIEWS: CPT | Performed by: INTERNAL MEDICINE

## 2019-04-04 PROCEDURE — 86850 RBC ANTIBODY SCREEN: CPT

## 2019-04-04 PROCEDURE — 85610 PROTHROMBIN TIME: CPT

## 2019-04-04 NOTE — TELEPHONE ENCOUNTER
Attempted to reach patient. LMTCB    Labs, signed EKG, CXR, OV note faxed to Dr Dinesh Reyes #[phone]. Also faxed same documents to Pre Admission testing at [facility] as indicated on request form from Dr Dinesh Reyes office.  PAT #[phone]

## 2019-04-04 NOTE — TELEPHONE ENCOUNTER
Please fax my office visit note along with patient's labs, EKG and chest x-ray to . In out box. In addition please let patient know that his labs came out reasonably well but his cholesterol increased from 6 months ago up to 224. It was 153.   H

## 2019-04-05 ENCOUNTER — TELEPHONE (OUTPATIENT)
Dept: INTERNAL MEDICINE CLINIC | Facility: CLINIC | Age: 64
End: 2019-04-05

## 2019-04-05 DIAGNOSIS — R82.90 ABNORMAL URINALYSIS: Primary | ICD-10-CM

## 2019-04-05 NOTE — TELEPHONE ENCOUNTER
Please call patient and let him know that after I just spoke to him about his cholesterol the last part of his urine test came back. It showed a very minor amount of bacteria.   Usually the lab will not even comment on this low level as it usually is not c

## 2019-04-05 NOTE — TELEPHONE ENCOUNTER
Received fax confirmations from Dr. Franky Zambrano office and from pre-admission testing. Copy sent to scanning.

## 2019-04-05 NOTE — TELEPHONE ENCOUNTER
Patient called back and was relayed 's message regarding the lab results. Patient states that he has been taking his cholesterol medication so can't really explain the elevation in the cholesterol numbers.  Patient states he will watch his diet mor

## 2019-04-05 NOTE — TELEPHONE ENCOUNTER
Discussed with patient. He has not changed his diet. We will just keep pravastatin the same since he is has his upcoming hip surgery replacement.   I discussed that I did not want to increase any risk of side effects such as muscle aches with increasing h

## 2019-04-05 NOTE — TELEPHONE ENCOUNTER
Spoke to pt and advised on MD message below; pt verbalized understanding and states he is agreeable to providing and additional urine specimen for culture.

## 2019-04-10 ENCOUNTER — APPOINTMENT (OUTPATIENT)
Dept: LAB | Age: 64
End: 2019-04-10
Attending: INTERNAL MEDICINE
Payer: COMMERCIAL

## 2019-04-10 DIAGNOSIS — R82.90 ABNORMAL URINALYSIS: ICD-10-CM

## 2019-04-10 PROCEDURE — 87086 URINE CULTURE/COLONY COUNT: CPT

## 2019-04-11 NOTE — TELEPHONE ENCOUNTER
Please tell patient his urine culture is totally clear now. Thank him for submitting the repeat specimen.

## 2019-04-18 RX ORDER — ENALAPRIL MALEATE 10 MG/1
10 TABLET ORAL EVERY MORNING
Qty: 90 TABLET | Refills: 3 | Status: SHIPPED | OUTPATIENT
Start: 2019-04-18 | End: 2020-02-03

## 2019-04-22 RX ORDER — PRAVASTATIN SODIUM 20 MG
TABLET ORAL
COMMUNITY
Start: 2014-02-18 | End: 2020-11-12 | Stop reason: ALTCHOICE

## 2019-04-22 RX ORDER — ENALAPRIL MALEATE 10 MG/1
TABLET ORAL
COMMUNITY
Start: 2017-12-01

## 2019-04-23 ENCOUNTER — DOCUMENTATION (OUTPATIENT)
Dept: CARDIOLOGY | Age: 64
End: 2019-04-23

## 2019-04-29 NOTE — H&P
[facility]    History & Physical    Carlotta Osgood Patient Status:  Outpatient in a Bed    1955 MRN [id number]   Location [place] [address] Attending Beth Solares, [facility] Day # 0 PCP Edie Carroll MD exam he's walking with a slight limp. On exam there's 0 degrees of internal rotation of the hip. He can do an active straight leg raise. He has 30 degrees of external rotation and abduction.       Results:     Lab Results   Component Value Date    WBC 6.1 0

## 2019-04-30 ENCOUNTER — HOSPITAL ENCOUNTER (INPATIENT)
Facility: HOSPITAL | Age: 64
LOS: 1 days | Discharge: HOME | DRG: 470 | End: 2019-05-01
Attending: ORTHOPAEDIC SURGERY | Admitting: ORTHOPAEDIC SURGERY
Payer: COMMERCIAL

## 2019-04-30 ENCOUNTER — ANESTHESIA EVENT (OUTPATIENT)
Dept: SURGERY | Facility: HOSPITAL | Age: 64
DRG: 470 | End: 2019-04-30
Payer: COMMERCIAL

## 2019-04-30 ENCOUNTER — ANESTHESIA (OUTPATIENT)
Dept: SURGERY | Facility: HOSPITAL | Age: 64
DRG: 470 | End: 2019-04-30
Payer: COMMERCIAL

## 2019-04-30 ENCOUNTER — APPOINTMENT (OUTPATIENT)
Dept: GENERAL RADIOLOGY | Facility: HOSPITAL | Age: 64
DRG: 470 | End: 2019-04-30
Attending: ORTHOPAEDIC SURGERY
Payer: COMMERCIAL

## 2019-04-30 ENCOUNTER — APPOINTMENT (OUTPATIENT)
Dept: GENERAL RADIOLOGY | Facility: HOSPITAL | Age: 64
DRG: 470 | End: 2019-04-30
Attending: PHYSICIAN ASSISTANT
Payer: COMMERCIAL

## 2019-04-30 PROBLEM — I10 ESSENTIAL HYPERTENSION: Chronic | Status: ACTIVE | Noted: 2019-04-30

## 2019-04-30 PROBLEM — M16.12 OSTEOARTHRITIS OF LEFT HIP: Status: ACTIVE | Noted: 2019-04-30

## 2019-04-30 PROCEDURE — 73502 X-RAY EXAM HIP UNI 2-3 VIEWS: CPT | Performed by: PHYSICIAN ASSISTANT

## 2019-04-30 PROCEDURE — 99232 SBSQ HOSP IP/OBS MODERATE 35: CPT | Performed by: HOSPITALIST

## 2019-04-30 PROCEDURE — 76000 FLUOROSCOPY <1 HR PHYS/QHP: CPT | Performed by: ORTHOPAEDIC SURGERY

## 2019-04-30 PROCEDURE — 0SRB02A REPLACEMENT OF LEFT HIP JOINT WITH METAL ON POLYETHYLENE SYNTHETIC SUBSTITUTE, UNCEMENTED, OPEN APPROACH: ICD-10-PCS | Performed by: ORTHOPAEDIC SURGERY

## 2019-04-30 DEVICE — FEMORAL HEAD Ø 32 SIZE M
Type: IMPLANTABLE DEVICE | Site: HIP | Status: FUNCTIONAL
Brand: MECTACER BIOLOX DELTA FEMORAL BALL HEAD

## 2019-04-30 DEVICE — CANCELLOUS BONE SCREW FLAT HEAD Ø 6,5 L30
Type: IMPLANTABLE DEVICE | Site: HIP | Status: FUNCTIONAL
Brand: MPACT ACETABULAR SYSTEM

## 2019-04-30 DEVICE — THA CAP, PRIMARY: Type: IMPLANTABLE DEVICE | Site: HIP | Status: FUNCTIONAL

## 2019-04-30 RX ORDER — HYDROCODONE BITARTRATE AND ACETAMINOPHEN 5; 325 MG/1; MG/1
1 TABLET ORAL AS NEEDED
Status: DISCONTINUED | OUTPATIENT
Start: 2019-04-30 | End: 2019-04-30 | Stop reason: HOSPADM

## 2019-04-30 RX ORDER — FAMOTIDINE 10 MG/ML
20 INJECTION, SOLUTION INTRAVENOUS 2 TIMES DAILY
Status: DISCONTINUED | OUTPATIENT
Start: 2019-04-30 | End: 2019-05-01

## 2019-04-30 RX ORDER — ONDANSETRON 2 MG/ML
4 INJECTION INTRAMUSCULAR; INTRAVENOUS ONCE AS NEEDED
Status: DISCONTINUED | OUTPATIENT
Start: 2019-04-30 | End: 2019-04-30 | Stop reason: HOSPADM

## 2019-04-30 RX ORDER — HYDROCODONE BITARTRATE AND ACETAMINOPHEN 7.5; 325 MG/1; MG/1
1 TABLET ORAL EVERY 6 HOURS PRN
Status: DISCONTINUED | OUTPATIENT
Start: 2019-04-30 | End: 2019-04-30

## 2019-04-30 RX ORDER — HYDROCODONE BITARTRATE AND ACETAMINOPHEN 7.5; 325 MG/1; MG/1
2 TABLET ORAL EVERY 6 HOURS PRN
Status: DISCONTINUED | OUTPATIENT
Start: 2019-04-30 | End: 2019-04-30

## 2019-04-30 RX ORDER — PRAVASTATIN SODIUM 20 MG
20 TABLET ORAL
Status: DISCONTINUED | OUTPATIENT
Start: 2019-05-01 | End: 2019-05-01

## 2019-04-30 RX ORDER — HYDROCODONE BITARTRATE AND ACETAMINOPHEN 5; 325 MG/1; MG/1
1 TABLET ORAL AS NEEDED
Status: DISCONTINUED | OUTPATIENT
Start: 2019-04-30 | End: 2019-04-30

## 2019-04-30 RX ORDER — HYDROCODONE BITARTRATE AND ACETAMINOPHEN 5; 325 MG/1; MG/1
2 TABLET ORAL AS NEEDED
Status: DISCONTINUED | OUTPATIENT
Start: 2019-04-30 | End: 2019-04-30

## 2019-04-30 RX ORDER — BISACODYL 10 MG
10 SUPPOSITORY, RECTAL RECTAL
Status: DISCONTINUED | OUTPATIENT
Start: 2019-04-30 | End: 2019-05-01

## 2019-04-30 RX ORDER — MORPHINE SULFATE 10 MG/ML
6 INJECTION, SOLUTION INTRAMUSCULAR; INTRAVENOUS EVERY 10 MIN PRN
Status: DISCONTINUED | OUTPATIENT
Start: 2019-04-30 | End: 2019-04-30 | Stop reason: HOSPADM

## 2019-04-30 RX ORDER — MORPHINE SULFATE 4 MG/ML
4 INJECTION, SOLUTION INTRAMUSCULAR; INTRAVENOUS EVERY 10 MIN PRN
Status: DISCONTINUED | OUTPATIENT
Start: 2019-04-30 | End: 2019-04-30 | Stop reason: HOSPADM

## 2019-04-30 RX ORDER — HYDROMORPHONE HYDROCHLORIDE 1 MG/ML
0.6 INJECTION, SOLUTION INTRAMUSCULAR; INTRAVENOUS; SUBCUTANEOUS
Status: DISCONTINUED | OUTPATIENT
Start: 2019-04-30 | End: 2019-04-30

## 2019-04-30 RX ORDER — HYDROMORPHONE HYDROCHLORIDE 1 MG/ML
0.2 INJECTION, SOLUTION INTRAMUSCULAR; INTRAVENOUS; SUBCUTANEOUS EVERY 5 MIN PRN
Status: DISCONTINUED | OUTPATIENT
Start: 2019-04-30 | End: 2019-04-30

## 2019-04-30 RX ORDER — HYDROCODONE BITARTRATE AND ACETAMINOPHEN 7.5; 325 MG/1; MG/1
1 TABLET ORAL EVERY 4 HOURS PRN
Qty: 50 TABLET | Refills: 0 | Status: SHIPPED | OUTPATIENT
Start: 2019-04-30 | End: 2020-01-30

## 2019-04-30 RX ORDER — SENNOSIDES 8.6 MG
17.2 TABLET ORAL NIGHTLY
Status: DISCONTINUED | OUTPATIENT
Start: 2019-04-30 | End: 2019-05-01

## 2019-04-30 RX ORDER — MORPHINE SULFATE 4 MG/ML
4 INJECTION, SOLUTION INTRAMUSCULAR; INTRAVENOUS EVERY 10 MIN PRN
Status: DISCONTINUED | OUTPATIENT
Start: 2019-04-30 | End: 2019-04-30

## 2019-04-30 RX ORDER — ONDANSETRON 2 MG/ML
INJECTION INTRAMUSCULAR; INTRAVENOUS AS NEEDED
Status: DISCONTINUED | OUTPATIENT
Start: 2019-04-30 | End: 2019-04-30 | Stop reason: SURG

## 2019-04-30 RX ORDER — ACETAMINOPHEN 500 MG
1000 TABLET ORAL ONCE
Status: COMPLETED | OUTPATIENT
Start: 2019-04-30 | End: 2019-04-30

## 2019-04-30 RX ORDER — SODIUM CHLORIDE 0.9 % (FLUSH) 0.9 %
10 SYRINGE (ML) INJECTION AS NEEDED
Status: DISCONTINUED | OUTPATIENT
Start: 2019-04-30 | End: 2019-05-01

## 2019-04-30 RX ORDER — NALOXONE HYDROCHLORIDE 0.4 MG/ML
80 INJECTION, SOLUTION INTRAMUSCULAR; INTRAVENOUS; SUBCUTANEOUS AS NEEDED
Status: DISCONTINUED | OUTPATIENT
Start: 2019-04-30 | End: 2019-04-30 | Stop reason: HOSPADM

## 2019-04-30 RX ORDER — DIPHENHYDRAMINE HYDROCHLORIDE 50 MG/ML
12.5 INJECTION INTRAMUSCULAR; INTRAVENOUS EVERY 4 HOURS PRN
Status: DISCONTINUED | OUTPATIENT
Start: 2019-04-30 | End: 2019-04-30

## 2019-04-30 RX ORDER — HYDROMORPHONE HYDROCHLORIDE 1 MG/ML
0.6 INJECTION, SOLUTION INTRAMUSCULAR; INTRAVENOUS; SUBCUTANEOUS EVERY 5 MIN PRN
Status: DISCONTINUED | OUTPATIENT
Start: 2019-04-30 | End: 2019-04-30

## 2019-04-30 RX ORDER — GABAPENTIN 300 MG/1
300 CAPSULE ORAL NIGHTLY
Qty: 20 CAPSULE | Refills: 0 | Status: SHIPPED | OUTPATIENT
Start: 2019-04-30 | End: 2020-01-30 | Stop reason: ALTCHOICE

## 2019-04-30 RX ORDER — SODIUM CHLORIDE, SODIUM LACTATE, POTASSIUM CHLORIDE, CALCIUM CHLORIDE 600; 310; 30; 20 MG/100ML; MG/100ML; MG/100ML; MG/100ML
INJECTION, SOLUTION INTRAVENOUS CONTINUOUS
Status: DISCONTINUED | OUTPATIENT
Start: 2019-04-30 | End: 2019-04-30 | Stop reason: HOSPADM

## 2019-04-30 RX ORDER — ACETAMINOPHEN 325 MG/1
650 TABLET ORAL EVERY 4 HOURS PRN
Status: DISCONTINUED | OUTPATIENT
Start: 2019-04-30 | End: 2019-05-01

## 2019-04-30 RX ORDER — MORPHINE SULFATE 10 MG/ML
6 INJECTION, SOLUTION INTRAMUSCULAR; INTRAVENOUS EVERY 10 MIN PRN
Status: DISCONTINUED | OUTPATIENT
Start: 2019-04-30 | End: 2019-04-30

## 2019-04-30 RX ORDER — MORPHINE SULFATE 4 MG/ML
4 INJECTION, SOLUTION INTRAMUSCULAR; INTRAVENOUS EVERY 2 HOUR PRN
Status: DISCONTINUED | OUTPATIENT
Start: 2019-04-30 | End: 2019-05-01

## 2019-04-30 RX ORDER — HYDROMORPHONE HYDROCHLORIDE 1 MG/ML
0.2 INJECTION, SOLUTION INTRAMUSCULAR; INTRAVENOUS; SUBCUTANEOUS EVERY 5 MIN PRN
Status: DISCONTINUED | OUTPATIENT
Start: 2019-04-30 | End: 2019-04-30 | Stop reason: HOSPADM

## 2019-04-30 RX ORDER — ONDANSETRON 2 MG/ML
4 INJECTION INTRAMUSCULAR; INTRAVENOUS EVERY 4 HOURS PRN
Status: DISCONTINUED | OUTPATIENT
Start: 2019-04-30 | End: 2019-05-01

## 2019-04-30 RX ORDER — LIDOCAINE HYDROCHLORIDE 10 MG/ML
INJECTION, SOLUTION EPIDURAL; INFILTRATION; INTRACAUDAL; PERINEURAL AS NEEDED
Status: DISCONTINUED | OUTPATIENT
Start: 2019-04-30 | End: 2019-04-30 | Stop reason: SURG

## 2019-04-30 RX ORDER — NALOXONE HYDROCHLORIDE 0.4 MG/ML
0.08 INJECTION, SOLUTION INTRAMUSCULAR; INTRAVENOUS; SUBCUTANEOUS
Status: DISCONTINUED | OUTPATIENT
Start: 2019-04-30 | End: 2019-04-30

## 2019-04-30 RX ORDER — METOCLOPRAMIDE 10 MG/1
10 TABLET ORAL ONCE
Status: COMPLETED | OUTPATIENT
Start: 2019-04-30 | End: 2019-04-30

## 2019-04-30 RX ORDER — DEXTROSE, SODIUM CHLORIDE, AND POTASSIUM CHLORIDE 5; .45; .15 G/100ML; G/100ML; G/100ML
INJECTION INTRAVENOUS CONTINUOUS
Status: DISCONTINUED | OUTPATIENT
Start: 2019-04-30 | End: 2019-05-01

## 2019-04-30 RX ORDER — SODIUM PHOSPHATE, DIBASIC AND SODIUM PHOSPHATE, MONOBASIC 7; 19 G/133ML; G/133ML
1 ENEMA RECTAL ONCE AS NEEDED
Status: DISCONTINUED | OUTPATIENT
Start: 2019-04-30 | End: 2019-05-01

## 2019-04-30 RX ORDER — ENALAPRIL MALEATE 10 MG/1
10 TABLET ORAL EVERY MORNING
Status: DISCONTINUED | OUTPATIENT
Start: 2019-05-01 | End: 2019-05-01

## 2019-04-30 RX ORDER — MORPHINE SULFATE 4 MG/ML
2 INJECTION, SOLUTION INTRAMUSCULAR; INTRAVENOUS EVERY 10 MIN PRN
Status: DISCONTINUED | OUTPATIENT
Start: 2019-04-30 | End: 2019-04-30

## 2019-04-30 RX ORDER — MELATONIN
325
Qty: 20 TABLET | Refills: 0 | Status: SHIPPED | OUTPATIENT
Start: 2019-05-01 | End: 2020-01-30 | Stop reason: ALTCHOICE

## 2019-04-30 RX ORDER — ONDANSETRON 2 MG/ML
4 INJECTION INTRAMUSCULAR; INTRAVENOUS ONCE AS NEEDED
Status: DISCONTINUED | OUTPATIENT
Start: 2019-04-30 | End: 2019-04-30

## 2019-04-30 RX ORDER — ACETAMINOPHEN 325 MG/1
650 TABLET ORAL EVERY 6 HOURS PRN
Status: DISCONTINUED | OUTPATIENT
Start: 2019-04-30 | End: 2019-04-30

## 2019-04-30 RX ORDER — DIPHENHYDRAMINE HCL 25 MG
25 CAPSULE ORAL EVERY 4 HOURS PRN
Status: DISCONTINUED | OUTPATIENT
Start: 2019-04-30 | End: 2019-04-30

## 2019-04-30 RX ORDER — MORPHINE SULFATE 4 MG/ML
2 INJECTION, SOLUTION INTRAMUSCULAR; INTRAVENOUS EVERY 2 HOUR PRN
Status: DISCONTINUED | OUTPATIENT
Start: 2019-04-30 | End: 2019-05-01

## 2019-04-30 RX ORDER — GABAPENTIN 600 MG/1
600 TABLET ORAL ONCE
Status: COMPLETED | OUTPATIENT
Start: 2019-04-30 | End: 2019-04-30

## 2019-04-30 RX ORDER — DEXAMETHASONE SODIUM PHOSPHATE 4 MG/ML
VIAL (ML) INJECTION AS NEEDED
Status: DISCONTINUED | OUTPATIENT
Start: 2019-04-30 | End: 2019-04-30 | Stop reason: SURG

## 2019-04-30 RX ORDER — SODIUM CHLORIDE, SODIUM LACTATE, POTASSIUM CHLORIDE, CALCIUM CHLORIDE 600; 310; 30; 20 MG/100ML; MG/100ML; MG/100ML; MG/100ML
INJECTION, SOLUTION INTRAVENOUS CONTINUOUS
Status: DISCONTINUED | OUTPATIENT
Start: 2019-04-30 | End: 2019-04-30

## 2019-04-30 RX ORDER — DOCUSATE SODIUM 100 MG/1
100 CAPSULE, LIQUID FILLED ORAL 2 TIMES DAILY
Status: DISCONTINUED | OUTPATIENT
Start: 2019-04-30 | End: 2019-05-01

## 2019-04-30 RX ORDER — ASPIRIN 325 MG
325 TABLET ORAL 2 TIMES DAILY
Status: DISCONTINUED | OUTPATIENT
Start: 2019-04-30 | End: 2019-05-01

## 2019-04-30 RX ORDER — HYDROMORPHONE HYDROCHLORIDE 1 MG/ML
0.4 INJECTION, SOLUTION INTRAMUSCULAR; INTRAVENOUS; SUBCUTANEOUS
Status: DISCONTINUED | OUTPATIENT
Start: 2019-04-30 | End: 2019-04-30

## 2019-04-30 RX ORDER — MORPHINE SULFATE 1 MG/ML
INJECTION, SOLUTION EPIDURAL; INTRATHECAL; INTRAVENOUS AS NEEDED
Status: DISCONTINUED | OUTPATIENT
Start: 2019-04-30 | End: 2019-04-30 | Stop reason: SURG

## 2019-04-30 RX ORDER — BUPIVACAINE HYDROCHLORIDE 7.5 MG/ML
INJECTION, SOLUTION INTRASPINAL AS NEEDED
Status: DISCONTINUED | OUTPATIENT
Start: 2019-04-30 | End: 2019-04-30 | Stop reason: SURG

## 2019-04-30 RX ORDER — POLYETHYLENE GLYCOL 3350 17 G/17G
17 POWDER, FOR SOLUTION ORAL DAILY PRN
Status: DISCONTINUED | OUTPATIENT
Start: 2019-04-30 | End: 2019-05-01

## 2019-04-30 RX ORDER — HYDROCODONE BITARTRATE AND ACETAMINOPHEN 7.5; 325 MG/1; MG/1
1 TABLET ORAL EVERY 4 HOURS PRN
Status: DISCONTINUED | OUTPATIENT
Start: 2019-04-30 | End: 2019-05-01

## 2019-04-30 RX ORDER — DIPHENHYDRAMINE HYDROCHLORIDE 50 MG/ML
25 INJECTION INTRAMUSCULAR; INTRAVENOUS ONCE AS NEEDED
Status: ACTIVE | OUTPATIENT
Start: 2019-04-30 | End: 2019-04-30

## 2019-04-30 RX ORDER — NALOXONE HYDROCHLORIDE 0.4 MG/ML
80 INJECTION, SOLUTION INTRAMUSCULAR; INTRAVENOUS; SUBCUTANEOUS AS NEEDED
Status: DISCONTINUED | OUTPATIENT
Start: 2019-04-30 | End: 2019-04-30

## 2019-04-30 RX ORDER — NALBUPHINE HCL 10 MG/ML
2.5 AMPUL (ML) INJECTION EVERY 4 HOURS PRN
Status: DISCONTINUED | OUTPATIENT
Start: 2019-04-30 | End: 2019-04-30

## 2019-04-30 RX ORDER — MORPHINE SULFATE 2 MG/ML
2 INJECTION, SOLUTION INTRAMUSCULAR; INTRAVENOUS EVERY 10 MIN PRN
Status: DISCONTINUED | OUTPATIENT
Start: 2019-04-30 | End: 2019-04-30 | Stop reason: HOSPADM

## 2019-04-30 RX ORDER — CELECOXIB 200 MG/1
200 CAPSULE ORAL EVERY 12 HOURS SCHEDULED
Qty: 60 CAPSULE | Refills: 0 | Status: SHIPPED | OUTPATIENT
Start: 2019-04-30 | End: 2020-01-30

## 2019-04-30 RX ORDER — FAMOTIDINE 20 MG/1
20 TABLET ORAL ONCE
Status: COMPLETED | OUTPATIENT
Start: 2019-04-30 | End: 2019-04-30

## 2019-04-30 RX ORDER — HYDROCODONE BITARTRATE AND ACETAMINOPHEN 7.5; 325 MG/1; MG/1
2 TABLET ORAL EVERY 4 HOURS PRN
Status: DISCONTINUED | OUTPATIENT
Start: 2019-04-30 | End: 2019-05-01

## 2019-04-30 RX ORDER — HYDROCODONE BITARTRATE AND ACETAMINOPHEN 5; 325 MG/1; MG/1
2 TABLET ORAL AS NEEDED
Status: DISCONTINUED | OUTPATIENT
Start: 2019-04-30 | End: 2019-04-30 | Stop reason: HOSPADM

## 2019-04-30 RX ORDER — ASPIRIN 325 MG
325 TABLET ORAL 2 TIMES DAILY
Qty: 60 TABLET | Refills: 0 | Status: SHIPPED | OUTPATIENT
Start: 2019-04-30 | End: 2020-01-30

## 2019-04-30 RX ORDER — HYDROMORPHONE HYDROCHLORIDE 1 MG/ML
0.6 INJECTION, SOLUTION INTRAMUSCULAR; INTRAVENOUS; SUBCUTANEOUS EVERY 5 MIN PRN
Status: DISCONTINUED | OUTPATIENT
Start: 2019-04-30 | End: 2019-04-30 | Stop reason: HOSPADM

## 2019-04-30 RX ORDER — GABAPENTIN 300 MG/1
300 CAPSULE ORAL NIGHTLY
Status: DISCONTINUED | OUTPATIENT
Start: 2019-04-30 | End: 2019-05-01

## 2019-04-30 RX ORDER — CEFAZOLIN SODIUM/WATER 2 G/20 ML
2 SYRINGE (ML) INTRAVENOUS ONCE
Status: COMPLETED | OUTPATIENT
Start: 2019-04-30 | End: 2019-04-30

## 2019-04-30 RX ORDER — HYDROMORPHONE HYDROCHLORIDE 1 MG/ML
0.4 INJECTION, SOLUTION INTRAMUSCULAR; INTRAVENOUS; SUBCUTANEOUS EVERY 5 MIN PRN
Status: DISCONTINUED | OUTPATIENT
Start: 2019-04-30 | End: 2019-04-30

## 2019-04-30 RX ORDER — CELECOXIB 200 MG/1
200 CAPSULE ORAL EVERY 12 HOURS SCHEDULED
Status: DISCONTINUED | OUTPATIENT
Start: 2019-04-30 | End: 2019-05-01

## 2019-04-30 RX ORDER — MIDAZOLAM HYDROCHLORIDE 1 MG/ML
INJECTION INTRAMUSCULAR; INTRAVENOUS AS NEEDED
Status: DISCONTINUED | OUTPATIENT
Start: 2019-04-30 | End: 2019-04-30 | Stop reason: SURG

## 2019-04-30 RX ORDER — PSEUDOEPHEDRINE HCL 30 MG
100 TABLET ORAL 2 TIMES DAILY
Qty: 30 CAPSULE | Refills: 0 | Status: SHIPPED | OUTPATIENT
Start: 2019-04-30 | End: 2020-01-30

## 2019-04-30 RX ORDER — HYDROMORPHONE HYDROCHLORIDE 1 MG/ML
0.4 INJECTION, SOLUTION INTRAMUSCULAR; INTRAVENOUS; SUBCUTANEOUS EVERY 5 MIN PRN
Status: DISCONTINUED | OUTPATIENT
Start: 2019-04-30 | End: 2019-04-30 | Stop reason: HOSPADM

## 2019-04-30 RX ORDER — CELECOXIB 200 MG/1
400 CAPSULE ORAL ONCE
Status: COMPLETED | OUTPATIENT
Start: 2019-04-30 | End: 2019-04-30

## 2019-04-30 RX ORDER — MORPHINE SULFATE 4 MG/ML
1 INJECTION, SOLUTION INTRAMUSCULAR; INTRAVENOUS EVERY 2 HOUR PRN
Status: DISCONTINUED | OUTPATIENT
Start: 2019-04-30 | End: 2019-05-01

## 2019-04-30 RX ORDER — METOCLOPRAMIDE HYDROCHLORIDE 5 MG/ML
10 INJECTION INTRAMUSCULAR; INTRAVENOUS EVERY 6 HOURS PRN
Status: DISCONTINUED | OUTPATIENT
Start: 2019-04-30 | End: 2019-05-01

## 2019-04-30 RX ORDER — FAMOTIDINE 20 MG/1
20 TABLET ORAL 2 TIMES DAILY
Status: DISCONTINUED | OUTPATIENT
Start: 2019-04-30 | End: 2019-05-01

## 2019-04-30 RX ORDER — MELATONIN
325
Status: DISCONTINUED | OUTPATIENT
Start: 2019-05-01 | End: 2019-05-01

## 2019-04-30 RX ADMIN — SODIUM CHLORIDE, SODIUM LACTATE, POTASSIUM CHLORIDE, CALCIUM CHLORIDE: 600; 310; 30; 20 INJECTION, SOLUTION INTRAVENOUS at 11:35:00

## 2019-04-30 RX ADMIN — CEFAZOLIN SODIUM/WATER 2 G: 2 G/20 ML SYRINGE (ML) INTRAVENOUS at 10:20:00

## 2019-04-30 RX ADMIN — LIDOCAINE HYDROCHLORIDE 25 MG: 10 INJECTION, SOLUTION EPIDURAL; INFILTRATION; INTRACAUDAL; PERINEURAL at 10:17:00

## 2019-04-30 RX ADMIN — MIDAZOLAM HYDROCHLORIDE 2 MG: 1 INJECTION INTRAMUSCULAR; INTRAVENOUS at 10:00:00

## 2019-04-30 RX ADMIN — SODIUM CHLORIDE, SODIUM LACTATE, POTASSIUM CHLORIDE, CALCIUM CHLORIDE: 600; 310; 30; 20 INJECTION, SOLUTION INTRAVENOUS at 12:07:00

## 2019-04-30 RX ADMIN — DEXAMETHASONE SODIUM PHOSPHATE 4 MG: 4 MG/ML VIAL (ML) INJECTION at 10:30:00

## 2019-04-30 RX ADMIN — ONDANSETRON 4 MG: 2 INJECTION INTRAMUSCULAR; INTRAVENOUS at 10:30:00

## 2019-04-30 RX ADMIN — MORPHINE SULFATE 0.3 MG: 1 INJECTION, SOLUTION EPIDURAL; INTRATHECAL; INTRAVENOUS at 10:17:00

## 2019-04-30 RX ADMIN — BUPIVACAINE HYDROCHLORIDE 1.6 ML: 7.5 INJECTION, SOLUTION INTRASPINAL at 10:17:00

## 2019-04-30 NOTE — OPERATIVE REPORT
[facility]    PATIENT'S NAME: Candace Primrose   ATTENDING PHYSICIAN: James Marquez MD   OPERATING PHYSICIAN: Alexandrea Gamboa MD   PATIENT ACCOUNT#:   [id number]    LOCATION:  [facility] [address] PACU 12 [facility] 10  MEDICAL RECORD #:   [id number] removed. The labrum was excised. The acetabulum was reamed in the appropriate version and inclination and verified with C-arm. The acetabulum was reamed to 54 mm, and a Medacta Impact 54 cup was inserted.   Position was verified with the C-arm, and a sup

## 2019-04-30 NOTE — PROGRESS NOTES
[facility]OSP - [facility]    Progress Note    Iftikhar Tracey Patient Status:  Outpatient in a Bed    1955 MRN [id number]   Location [address] [facility] Attending Christy Martin MD   Hosp Day # 0 PCP Jj Chao MD hypertension  CONT HOME MEDS.              Results:     Lab Results   Component Value Date    WBC 6.1 04/04/2019    HGB 13.6 04/04/2019    HCT 42.2 04/04/2019    .0 04/04/2019    CREATSERUM 1.05 04/04/2019    BUN 17 04/04/2019     04/04/2019

## 2019-04-30 NOTE — INTERVAL H&P NOTE
Pre-op Diagnosis: osteoarthritis left hip    The above referenced H&P was reviewed by Yovana Lara MD on 4/30/2019, the patient was examined and no significant changes have occurred in the patient's condition since the H&P was performed.   I discu

## 2019-04-30 NOTE — BRIEF OP NOTE
Pre-Operative Diagnosis: osteoarthritis left hip     Post-Operative Diagnosis: osteoarthritis left hip      Procedure Performed:   Procedure(s):  left total hip arthroplasty with anterior approach    Surgeon(s) and Role:     * Yanet Junior MD -

## 2019-04-30 NOTE — ANESTHESIA PREPROCEDURE EVALUATION
Anesthesia PreOp Note    HPI:     Anne Boyer is a 61year old male who presents for preoperative consultation requested by: Holly Sung MD    Date of Surgery: 4/30/2019    Procedure(s):  HIP TOTAL ANTERIOR APPROACH  Indication: Juan Zuluaga 4/22/2019   Pravastatin Sodium 20 MG Oral Tab Take 1 tablet (20 mg total) by mouth once daily. Disp: 90 tablet Rfl: 3 4/29/2019 at 0800   Clindamycin Phosphate 1 % External Swab APPLY 1 APPLICATION TOPICALLY DAILY.  USE AFTER SHAVING Disp:  Rfl: 3 4/23/2019 Relationships      Social connections:        Talks on phone: Not on file        Gets together: Not on file        Attends L[employer] day service: Not on file        Active member of club or organization: Not on file        Attends meetings of clubs or [place] (abnormal) and his pulse is 101. His respiration is 18 and oxygen saturation is 96%.     04/22/19  1012 04/30/19  0745   BP:  (!) 151/91   Pulse:  101   Resp:  18   Temp:  98.4 °F (36.9 °C)   TempSrc:  Temporal   SpO2:  96%   Weight: 93 kg (205 lb) 97.6 kg

## 2019-04-30 NOTE — PHYSICAL THERAPY NOTE
PHYSICAL THERAPY HIP EVALUATION - INPATIENT     Room Number: 423/423-A  Evaluation Date: 4/30/2019  Type of Evaluation: Initial  Physician Order: PT Eval and Treat    Presenting Problem: L MIMA- anterior  Reason for Therapy: Mobility Dysfunction and Dischar to optimize functional outcomes. Recommending RW be issued to improve stability and safety of gait. Patient will benefit from continued IP PT services to address these deficits in preparation for discharge.     DISCHARGE RECOMMENDATIONS  PT Discharge Rec RESTRICTION  Weight Bearing Restriction: L lower extremity           L Lower Extremity: Weight Bearing as Tolerated    PAIN ASSESSMENT  Ratin          COGNITION  · Overall Cognitive Status:  WFL - within functional limits    RANGE OF MOTION AND STRENGT training    Patient End of Session: Up in chair;Needs met;Call light within reach;RN aware of session/findings; Alarm set    CURRENT GOALS    Goals to be met by: 5/6/19  Patient Goal Patient's self-stated goal is: return to PLOF   Goal #1 Patient is able to

## 2019-04-30 NOTE — ANESTHESIA POSTPROCEDURE EVALUATION
Patient: Yohannes Nuñez    Procedure Summary     Date:  04/30/19 Room / Location:  [facility] OR [address] [place] [address] OR    Anesthesia Start:  8328 Anesthesia Stop:      Procedure:  HIP TOTAL ANTERIOR APPROACH (Left Hip) Diagnosis:  (osteoarthritis left hip)

## 2019-04-30 NOTE — ANESTHESIA PROCEDURE NOTES
Spinal Block  Performed by: Carin Matthews CRNA  Authorized by: Jeannine Moore MD     Anesthesiologist:  Jeannine Moore MD  CRNA:  Carin Matthews CRNA  Performed by:   Anesthesiologist and CRNA  Preanesthetic Checklist: patient identified, IV ch

## 2019-04-30 NOTE — INTERVAL H&P NOTE
Pre-op Diagnosis: osteoarthritis left hip    The above referenced H&P was reviewed by Sarwat Rodas MD on 4/30/2019, the patient was examined and no significant changes have occurred in the patient's condition since the H&P was performed.   I discu

## 2019-05-01 VITALS
OXYGEN SATURATION: 94 % | HEIGHT: 69 IN | TEMPERATURE: 99 F | RESPIRATION RATE: 18 BRPM | SYSTOLIC BLOOD PRESSURE: 116 MMHG | WEIGHT: 215.13 LBS | BODY MASS INDEX: 31.86 KG/M2 | HEART RATE: 83 BPM | DIASTOLIC BLOOD PRESSURE: 81 MMHG

## 2019-05-01 LAB
ABSOLUTE IMMATURE GRANULOCYTES (OFFPRE24): NORMAL
BASO+EOS+MONOS # BLD: NORMAL 10*3/UL
BASO+EOS+MONOS NFR BLD: NORMAL %
BASOPHILS # BLD: NORMAL 10*3/UL
BASOPHILS NFR BLD: NORMAL %
DIFFERENTIAL METHOD BLD: NORMAL
EOSINOPHIL # BLD: NORMAL 10*3/UL
EOSINOPHIL NFR BLD: NORMAL %
ERYTHROCYTE [DISTWIDTH] IN BLOOD: NORMAL %
HCT VFR BLD CALC: 29.7 %
HGB BLD-MCNC: 9.9 G/DL
IMMATURE GRANULOCYTES (OFFPRE25): NORMAL
LYMPHOCYTES # BLD: NORMAL 10*3/UL
LYMPHOCYTES NFR BLD: NORMAL %
MCH RBC QN AUTO: 29.7 PG
MCHC RBC AUTO-ENTMCNC: 33.3 G/DL
MCV RBC AUTO: 89.2 FL
MONOCYTES # BLD: NORMAL 10*3/UL
MONOCYTES NFR BLD: NORMAL %
MPV (OFFPRE2): NORMAL
NEUTROPHILS # BLD: NORMAL 10*3/UL
NEUTROPHILS NFR BLD: NORMAL %
NRBC BLD MANUAL-RTO: NORMAL %
PLAT MORPH BLD: NORMAL
PLATELET # BLD: 212 10*3/UL
RBC # BLD: 3.33 10*6/UL
RBC MORPH BLD: NORMAL
WBC # BLD: 9.6 10*3/UL
WBC MORPH BLD: NORMAL

## 2019-05-01 PROCEDURE — 99239 HOSP IP/OBS DSCHRG MGMT >30: CPT | Performed by: HOSPITALIST

## 2019-05-01 RX ORDER — POLYETHYLENE GLYCOL 3350 17 G/17G
17 POWDER, FOR SOLUTION ORAL 2 TIMES DAILY PRN
Qty: 30 EACH | Refills: 0 | Status: SHIPPED | OUTPATIENT
Start: 2019-05-01 | End: 2020-01-30

## 2019-05-01 NOTE — PHYSICAL THERAPY NOTE
PHYSICAL THERAPY HIP TREATMENT NOTE - INPATIENT    Room Number: 423/423-A            Presenting Problem: L MIMA- anterior    Problem List  Principal Problem:    Osteoarthritis of left hip  Active Problems:    Hyperlipidemia    Essential hypertension      PH Score:  Raw Score: 19   Approx Degree of Impairment: 41.77%   Standardized Score (AM-PAC Scale): 45.44   CMS Modifier (G-Code): CK    FUNCTIONAL ABILITY STATUS  Gait Assessment   Gait Assistance: Contact guard assist  Distance (ft): 2 x 200  [facility]ive [facility]

## 2019-05-01 NOTE — PROGRESS NOTES
[facility]    Progress Note    Manuel Gave Patient Status:  Outpatient in a Bed    1955 MRN [id number]   Location [facility] 4W/SW/SE Attending Ria John MD   Hosp Day # 0 PCP Tamika Mendiola MD       Subjective: Malik Downey MD on 4/30/2019 at 14:02     Approved by (CST):  Malik Downey MD on 4/30/2019 at 14:03                  Lesley Pierre MD  5/1/2019

## 2019-05-01 NOTE — PLAN OF CARE
Kar is ambulating safely with walker. His pain is controlled with Norco as ordered. No s/s infection. Reviewed discharge instructions including Prevena wound vac care and change date. No s/s infection.   He will discharge to home and pursue outpatient P

## 2019-05-01 NOTE — OCCUPATIONAL THERAPY NOTE
OCCUPATIONAL THERAPY EVALUATION - INPATIENT     Room Number: 423/423-A  Evaluation Date: 5/1/2019  Type of Evaluation: Quick Eval  Presenting Problem: (OA of L hip)    Physician Order: IP Consult to Occupational Therapy  Reason for Therapy: ADL/IADL Dysf Inpatient Daily Activity Short Form.   Research supports that patients with this level of impairment may benefit from home with eventual OP PT.    DISCHARGE RECOMMENDATIONS  OT Discharge Recommendations: Home  OT Device Recommendations: Shower chair    PLAN let me go home today\"     OCCUPATIONAL THERAPY EXAMINATION      OBJECTIVE  Precautions: MIMA - anterior;Limb alert - left  Fall Risk: Standard fall risk    WEIGHT BEARING RESTRICTION           L Lower Extremity: Weight Bearing as Tolerated    PAIN ASSESSME indep  Lower Extremity Dressing: mod I     Patient End of Session: Up in chair;Needs met;Call light within reach;RN aware of session/findings; All patient questions and concerns addressed    Patient was able to achieve the following . ..    Patient able to to

## 2019-05-01 NOTE — CM/SW NOTE
IFEOMA met with the patient at bedside. The patient will stay with his son upon Dc while recovering, he lives in 1 level house  . The patient responds being independent prior to admission with all ADL's, ambulation and driving. Patient denies use of any DME.

## 2019-05-01 NOTE — DISCHARGE SUMMARY
[facility]    Discharge Summary    Opal Lau Patient Status:  Inpatient    1955 MRN [id number]   Location [facility] 4W/SW/SE Attending Alyssa Diaz MD   Hosp Day # 1 PCP Brittney Mondragon MD     Date of Admission: Prescription details   celecoxib 200 MG Caps  Commonly known as:  CeleBREX      Take 1 capsule (200 mg total) by mouth every 12 (twelve) hours.    Quantity:  60 capsule  Refills:  0     docusate sodium 100 MG Caps  Commonly known as:  COLACE      Take 100 m 0           Where to Get Your Medications      Please  your prescriptions at the location directed by your doctor or nurse    Bring a paper prescription for each of these medications  · aspirin 325 MG Tabs  · celecoxib 200 MG Caps  · docusate sodium

## 2019-05-02 ENCOUNTER — TELEPHONE (OUTPATIENT)
Dept: INTERNAL MEDICINE CLINIC | Facility: CLINIC | Age: 64
End: 2019-05-02

## 2019-05-02 NOTE — TELEPHONE ENCOUNTER
Pt discharged from [facility] on 5/1/19 . Please call to schedule follow up with Primary Care Physician.    Thanks

## 2019-11-26 ENCOUNTER — TELEPHONE (OUTPATIENT)
Dept: INTERNAL MEDICINE CLINIC | Facility: CLINIC | Age: 64
End: 2019-11-26

## 2020-01-13 PROBLEM — Z82.49 FAMILY HISTORY OF EARLY CAD: Status: ACTIVE | Noted: 2020-01-13

## 2020-01-13 PROBLEM — I10 HTN (HYPERTENSION): Status: ACTIVE | Noted: 2020-01-13

## 2020-01-13 PROBLEM — E78.00 PURE HYPERCHOLESTEROLEMIA: Status: ACTIVE | Noted: 2020-01-13

## 2020-01-13 PROBLEM — R93.1 ABNORMAL HEART SCORE CT: Status: ACTIVE | Noted: 2020-01-13

## 2020-01-14 ENCOUNTER — OFFICE VISIT (OUTPATIENT)
Dept: CARDIOLOGY | Age: 65
End: 2020-01-14

## 2020-01-14 VITALS
WEIGHT: 226.9 LBS | DIASTOLIC BLOOD PRESSURE: 90 MMHG | BODY MASS INDEX: 33.61 KG/M2 | HEIGHT: 69 IN | SYSTOLIC BLOOD PRESSURE: 138 MMHG | HEART RATE: 100 BPM

## 2020-01-14 DIAGNOSIS — Z82.49 FAMILY HISTORY OF ISCHEMIC HEART DISEASE (IHD): ICD-10-CM

## 2020-01-14 DIAGNOSIS — E78.00 PURE HYPERCHOLESTEROLEMIA: ICD-10-CM

## 2020-01-14 DIAGNOSIS — R93.1 AGATSTON CORONARY ARTERY CALCIUM SCORE LESS THAN 100: Primary | ICD-10-CM

## 2020-01-14 PROCEDURE — 3075F SYST BP GE 130 - 139MM HG: CPT | Performed by: INTERNAL MEDICINE

## 2020-01-14 PROCEDURE — 99214 OFFICE O/P EST MOD 30 MIN: CPT | Performed by: INTERNAL MEDICINE

## 2020-01-14 ASSESSMENT — PATIENT HEALTH QUESTIONNAIRE - PHQ9
2. FEELING DOWN, DEPRESSED OR HOPELESS: NOT AT ALL
1. LITTLE INTEREST OR PLEASURE IN DOING THINGS: NOT AT ALL
SUM OF ALL RESPONSES TO PHQ9 QUESTIONS 1 AND 2: 0
SUM OF ALL RESPONSES TO PHQ9 QUESTIONS 1 AND 2: 0

## 2020-01-19 RX ORDER — PRAVASTATIN SODIUM 20 MG
TABLET ORAL
Qty: 90 TABLET | Refills: 3 | Status: SHIPPED | OUTPATIENT
Start: 2020-01-19 | End: 2020-02-07 | Stop reason: ALTCHOICE

## 2020-01-30 ENCOUNTER — OFFICE VISIT (OUTPATIENT)
Dept: INTERNAL MEDICINE CLINIC | Facility: CLINIC | Age: 65
End: 2020-01-30
Payer: COMMERCIAL

## 2020-01-30 VITALS
SYSTOLIC BLOOD PRESSURE: 136 MMHG | HEIGHT: 69 IN | HEART RATE: 98 BPM | TEMPERATURE: 98 F | RESPIRATION RATE: 16 BRPM | WEIGHT: 218 LBS | BODY MASS INDEX: 32.29 KG/M2 | OXYGEN SATURATION: 99 % | DIASTOLIC BLOOD PRESSURE: 86 MMHG

## 2020-01-30 DIAGNOSIS — Z00.00 ROUTINE HEALTH MAINTENANCE: ICD-10-CM

## 2020-01-30 DIAGNOSIS — I10 ESSENTIAL HYPERTENSION: Primary | ICD-10-CM

## 2020-01-30 DIAGNOSIS — E78.5 HYPERLIPIDEMIA, UNSPECIFIED HYPERLIPIDEMIA TYPE: ICD-10-CM

## 2020-01-30 DIAGNOSIS — Z23 FLU VACCINE NEED: ICD-10-CM

## 2020-01-30 PROCEDURE — 99212 OFFICE O/P EST SF 10 MIN: CPT | Performed by: INTERNAL MEDICINE

## 2020-01-30 PROCEDURE — 99214 OFFICE O/P EST MOD 30 MIN: CPT | Performed by: INTERNAL MEDICINE

## 2020-01-30 NOTE — PROGRESS NOTES
Hazel Cisneros is a 59year old male   HPI:   Pt.presents for the following problems. Patient feels well. He has no complaints. He is done very well with his left hip replacement. He did recently see Dr. Abelardo Fall.   Blood pressure was a little bit COLONOSCOPY, POSSIBLE BIOPSY, POSSIBLE POLYPECTOMY 33803 N/A 6/16/2016    Performed by Ryan Pretty MD at [facility]   • EXCISE VARICOCELE     • HIP TOTAL ANTERIOR APPROACH Left 4/30/2019    Performed by Stu Edwards MD at [facility] 3-year follow-up advised. He had a sending colonic polyps that showed multiple portions of tubular adenoma. Cecal polyp showed tubular adenoma and a descending colonic polyp showed tubular adenoma  EXTREMITIES:  no cyanosis, clubbing or edema.     NEURO:

## 2020-02-03 RX ORDER — ENALAPRIL MALEATE 10 MG/1
10 TABLET ORAL EVERY MORNING
Qty: 90 TABLET | Refills: 3 | Status: SHIPPED | OUTPATIENT
Start: 2020-02-03 | End: 2021-01-22

## 2020-02-07 ENCOUNTER — TELEPHONE (OUTPATIENT)
Dept: INTERNAL MEDICINE CLINIC | Facility: CLINIC | Age: 65
End: 2020-02-07

## 2020-02-07 ENCOUNTER — APPOINTMENT (OUTPATIENT)
Dept: LAB | Age: 65
End: 2020-02-07
Attending: INTERNAL MEDICINE
Payer: COMMERCIAL

## 2020-02-07 DIAGNOSIS — E78.5 HYPERLIPIDEMIA, UNSPECIFIED HYPERLIPIDEMIA TYPE: Primary | ICD-10-CM

## 2020-02-07 DIAGNOSIS — I10 ESSENTIAL HYPERTENSION: Primary | ICD-10-CM

## 2020-02-07 LAB
ALBUMIN SERPL-MCNC: 4.4 G/DL (ref 3.4–5)
ALBUMIN/GLOB SERPL: 1.3 {RATIO} (ref 1–2)
ALP LIVER SERPL-CCNC: 56 U/L (ref 45–117)
ALT SERPL-CCNC: 37 U/L (ref 16–61)
ANION GAP SERPL CALC-SCNC: 9 MMOL/L (ref 0–18)
AST SERPL-CCNC: 24 U/L (ref 15–37)
BASOPHILS # BLD AUTO: 0.05 X10(3) UL (ref 0–0.2)
BASOPHILS NFR BLD AUTO: 0.8 %
BILIRUB SERPL-MCNC: 0.5 MG/DL (ref 0.1–2)
BUN BLD-MCNC: 17 MG/DL (ref 7–18)
BUN/CREAT SERPL: 16.3 (ref 10–20)
CALCIUM BLD-MCNC: 8.8 MG/DL (ref 8.5–10.1)
CHLORIDE SERPL-SCNC: 107 MMOL/L (ref 98–112)
CHOLEST SMN-MCNC: 190 MG/DL (ref ?–200)
CO2 SERPL-SCNC: 23 MMOL/L (ref 21–32)
CREAT BLD-MCNC: 1.04 MG/DL (ref 0.7–1.3)
DEPRECATED RDW RBC AUTO: 39.6 FL (ref 35.1–46.3)
EOSINOPHIL # BLD AUTO: 0.14 X10(3) UL (ref 0–0.7)
EOSINOPHIL NFR BLD AUTO: 2.3 %
ERYTHROCYTE [DISTWIDTH] IN BLOOD BY AUTOMATED COUNT: 12.3 % (ref 11–15)
GLOBULIN PLAS-MCNC: 3.3 G/DL (ref 2.8–4.4)
GLUCOSE BLD-MCNC: 96 MG/DL (ref 70–99)
HCT VFR BLD AUTO: 41.7 % (ref 39–53)
HDLC SERPL-MCNC: 51 MG/DL (ref 40–59)
HGB BLD-MCNC: 13.6 G/DL (ref 13–17.5)
IMM GRANULOCYTES # BLD AUTO: 0.02 X10(3) UL (ref 0–1)
IMM GRANULOCYTES NFR BLD: 0.3 %
LDLC SERPL CALC-MCNC: 118 MG/DL (ref ?–100)
LYMPHOCYTES # BLD AUTO: 1.65 X10(3) UL (ref 1–4)
LYMPHOCYTES NFR BLD AUTO: 27 %
M PROTEIN MFR SERPL ELPH: 7.7 G/DL (ref 6.4–8.2)
MCH RBC QN AUTO: 28.6 PG (ref 26–34)
MCHC RBC AUTO-ENTMCNC: 32.6 G/DL (ref 31–37)
MCV RBC AUTO: 87.8 FL (ref 80–100)
MONOCYTES # BLD AUTO: 0.57 X10(3) UL (ref 0.1–1)
MONOCYTES NFR BLD AUTO: 9.3 %
NEUTROPHILS # BLD AUTO: 3.67 X10 (3) UL (ref 1.5–7.7)
NEUTROPHILS # BLD AUTO: 3.67 X10(3) UL (ref 1.5–7.7)
NEUTROPHILS NFR BLD AUTO: 60.3 %
NONHDLC SERPL-MCNC: 139 MG/DL (ref ?–130)
OSMOLALITY SERPL CALC.SUM OF ELEC: 289 MOSM/KG (ref 275–295)
PATIENT FASTING Y/N/NP: YES
PATIENT FASTING Y/N/NP: YES
PLATELET # BLD AUTO: 304 10(3)UL (ref 150–450)
POTASSIUM SERPL-SCNC: 4.4 MMOL/L (ref 3.5–5.1)
RBC # BLD AUTO: 4.75 X10(6)UL (ref 4.3–5.7)
SODIUM SERPL-SCNC: 139 MMOL/L (ref 136–145)
TRIGL SERPL-MCNC: 104 MG/DL (ref 30–149)
VLDLC SERPL CALC-MCNC: 21 MG/DL (ref 0–30)
WBC # BLD AUTO: 6.1 X10(3) UL (ref 4–11)

## 2020-02-07 PROCEDURE — 36415 COLL VENOUS BLD VENIPUNCTURE: CPT | Performed by: INTERNAL MEDICINE

## 2020-02-07 PROCEDURE — 85025 COMPLETE CBC W/AUTO DIFF WBC: CPT | Performed by: INTERNAL MEDICINE

## 2020-02-07 PROCEDURE — 80061 LIPID PANEL: CPT | Performed by: INTERNAL MEDICINE

## 2020-02-07 PROCEDURE — 80053 COMPREHEN METABOLIC PANEL: CPT | Performed by: INTERNAL MEDICINE

## 2020-02-07 RX ORDER — ATORVASTATIN CALCIUM 20 MG/1
20 TABLET, FILM COATED ORAL NIGHTLY
Qty: 90 TABLET | Refills: 3 | Status: SHIPPED | OUTPATIENT
Start: 2020-02-07 | End: 2021-05-09

## 2020-02-07 NOTE — TELEPHONE ENCOUNTER
Please let patient know that his labs look fairly acceptable but his cholesterol was still little bit elevated.   Dr. Deidra Carlisle indicated to change from pravastatin to atorvastatin 20 mg since the cholesterol is still above goal.  If he is willing okay to send

## 2020-08-13 ENCOUNTER — OFFICE VISIT (OUTPATIENT)
Dept: INTERNAL MEDICINE CLINIC | Facility: CLINIC | Age: 65
End: 2020-08-13
Payer: COMMERCIAL

## 2020-08-13 VITALS
BODY MASS INDEX: 32.17 KG/M2 | WEIGHT: 217.19 LBS | DIASTOLIC BLOOD PRESSURE: 96 MMHG | TEMPERATURE: 99 F | HEART RATE: 98 BPM | SYSTOLIC BLOOD PRESSURE: 152 MMHG | OXYGEN SATURATION: 98 % | HEIGHT: 69 IN

## 2020-08-13 DIAGNOSIS — E78.5 HYPERLIPIDEMIA, UNSPECIFIED HYPERLIPIDEMIA TYPE: ICD-10-CM

## 2020-08-13 DIAGNOSIS — I10 ESSENTIAL HYPERTENSION: Primary | ICD-10-CM

## 2020-08-13 DIAGNOSIS — Z12.5 SCREENING FOR PROSTATE CANCER: ICD-10-CM

## 2020-08-13 PROCEDURE — 3008F BODY MASS INDEX DOCD: CPT | Performed by: INTERNAL MEDICINE

## 2020-08-13 PROCEDURE — 99212 OFFICE O/P EST SF 10 MIN: CPT | Performed by: INTERNAL MEDICINE

## 2020-08-13 PROCEDURE — 3077F SYST BP >= 140 MM HG: CPT | Performed by: INTERNAL MEDICINE

## 2020-08-13 PROCEDURE — 3080F DIAST BP >= 90 MM HG: CPT | Performed by: INTERNAL MEDICINE

## 2020-08-13 PROCEDURE — 99214 OFFICE O/P EST MOD 30 MIN: CPT | Performed by: INTERNAL MEDICINE

## 2020-08-13 NOTE — PROGRESS NOTES
Kinza Elizabeth is a 72year old male. HPI:   Patient presents with:  Checkup: 6 month f/u     Patient feels well. He has no unusual complaints. Patient sees Dr. Shanae Hankins once a year. His last visit was January 2020.   At that time assessment was pos distress  SKIN:  no rashes , no suspicious lesions  HEENT: atraumatic. Pharynx normal without exudate. EYES:  PERRL. Sclera anicteric. NECK:  Supple,  no adenopathy,  thyroid normal  LUNGS:  clear to auscultation.   Effort normal  CARDIO:  RRR without

## 2020-08-18 ENCOUNTER — TELEPHONE (OUTPATIENT)
Dept: INTERNAL MEDICINE CLINIC | Facility: CLINIC | Age: 65
End: 2020-08-18

## 2020-08-18 ENCOUNTER — APPOINTMENT (OUTPATIENT)
Dept: LAB | Age: 65
End: 2020-08-18
Attending: INTERNAL MEDICINE
Payer: COMMERCIAL

## 2020-08-18 DIAGNOSIS — Z12.5 SCREENING FOR PROSTATE CANCER: ICD-10-CM

## 2020-08-18 LAB
ANION GAP SERPL CALC-SCNC: 7 MMOL/L (ref 0–18)
BUN BLD-MCNC: 15 MG/DL (ref 7–18)
BUN/CREAT SERPL: 14 (ref 10–20)
CALCIUM BLD-MCNC: 9.9 MG/DL (ref 8.5–10.1)
CHLORIDE SERPL-SCNC: 107 MMOL/L (ref 98–112)
CHOLEST SMN-MCNC: 138 MG/DL (ref ?–200)
CO2 SERPL-SCNC: 25 MMOL/L (ref 21–32)
COMPLEXED PSA SERPL-MCNC: 1.36 NG/ML (ref ?–4)
CREAT BLD-MCNC: 1.07 MG/DL (ref 0.7–1.3)
GLUCOSE BLD-MCNC: 98 MG/DL (ref 70–99)
HDLC SERPL-MCNC: 53 MG/DL (ref 40–59)
LDLC SERPL CALC-MCNC: 69 MG/DL (ref ?–100)
NONHDLC SERPL-MCNC: 85 MG/DL (ref ?–130)
OSMOLALITY SERPL CALC.SUM OF ELEC: 289 MOSM/KG (ref 275–295)
PATIENT FASTING Y/N/NP: YES
PATIENT FASTING Y/N/NP: YES
POTASSIUM SERPL-SCNC: 4.6 MMOL/L (ref 3.5–5.1)
SODIUM SERPL-SCNC: 139 MMOL/L (ref 136–145)
TRIGL SERPL-MCNC: 80 MG/DL (ref 30–149)
VLDLC SERPL CALC-MCNC: 16 MG/DL (ref 0–30)

## 2020-08-18 PROCEDURE — 36415 COLL VENOUS BLD VENIPUNCTURE: CPT | Performed by: INTERNAL MEDICINE

## 2020-08-18 PROCEDURE — 80048 BASIC METABOLIC PNL TOTAL CA: CPT | Performed by: INTERNAL MEDICINE

## 2020-08-18 PROCEDURE — 80061 LIPID PANEL: CPT | Performed by: INTERNAL MEDICINE

## 2020-08-18 NOTE — TELEPHONE ENCOUNTER
Please let patient know that I thought his labs came out very good. Electrolytes good. PSA good. Lipids good.

## 2020-10-12 ENCOUNTER — TELEPHONE (OUTPATIENT)
Dept: CARDIOLOGY | Age: 65
End: 2020-10-12

## 2020-11-06 ENCOUNTER — TELEPHONE (OUTPATIENT)
Dept: CARDIOLOGY | Age: 65
End: 2020-11-06

## 2020-11-06 ENCOUNTER — LAB ENCOUNTER (OUTPATIENT)
Dept: LAB | Age: 65
End: 2020-11-06
Attending: INTERNAL MEDICINE
Payer: COMMERCIAL

## 2020-11-06 ENCOUNTER — OFFICE VISIT (OUTPATIENT)
Dept: INTERNAL MEDICINE CLINIC | Facility: CLINIC | Age: 65
End: 2020-11-06
Payer: COMMERCIAL

## 2020-11-06 ENCOUNTER — TELEPHONE (OUTPATIENT)
Dept: INTERNAL MEDICINE CLINIC | Facility: CLINIC | Age: 65
End: 2020-11-06

## 2020-11-06 VITALS
WEIGHT: 212.81 LBS | TEMPERATURE: 98 F | OXYGEN SATURATION: 98 % | BODY MASS INDEX: 30.47 KG/M2 | DIASTOLIC BLOOD PRESSURE: 90 MMHG | HEIGHT: 70 IN | HEART RATE: 106 BPM | SYSTOLIC BLOOD PRESSURE: 150 MMHG

## 2020-11-06 DIAGNOSIS — I10 ESSENTIAL HYPERTENSION: ICD-10-CM

## 2020-11-06 DIAGNOSIS — Z01.818 PRE-OP EVALUATION: ICD-10-CM

## 2020-11-06 DIAGNOSIS — E78.5 HYPERLIPIDEMIA, UNSPECIFIED HYPERLIPIDEMIA TYPE: ICD-10-CM

## 2020-11-06 DIAGNOSIS — Z01.818 PRE-OP EVALUATION: Primary | ICD-10-CM

## 2020-11-06 PROCEDURE — 3008F BODY MASS INDEX DOCD: CPT | Performed by: INTERNAL MEDICINE

## 2020-11-06 PROCEDURE — 99214 OFFICE O/P EST MOD 30 MIN: CPT | Performed by: INTERNAL MEDICINE

## 2020-11-06 PROCEDURE — 85025 COMPLETE CBC W/AUTO DIFF WBC: CPT | Performed by: INTERNAL MEDICINE

## 2020-11-06 PROCEDURE — 86850 RBC ANTIBODY SCREEN: CPT

## 2020-11-06 PROCEDURE — 99212 OFFICE O/P EST SF 10 MIN: CPT | Performed by: INTERNAL MEDICINE

## 2020-11-06 PROCEDURE — 87641 MR-STAPH DNA AMP PROBE: CPT

## 2020-11-06 PROCEDURE — 3080F DIAST BP >= 90 MM HG: CPT | Performed by: INTERNAL MEDICINE

## 2020-11-06 PROCEDURE — 85730 THROMBOPLASTIN TIME PARTIAL: CPT | Performed by: INTERNAL MEDICINE

## 2020-11-06 PROCEDURE — 86901 BLOOD TYPING SEROLOGIC RH(D): CPT

## 2020-11-06 PROCEDURE — 86900 BLOOD TYPING SEROLOGIC ABO: CPT

## 2020-11-06 PROCEDURE — 80053 COMPREHEN METABOLIC PANEL: CPT | Performed by: INTERNAL MEDICINE

## 2020-11-06 PROCEDURE — 81003 URINALYSIS AUTO W/O SCOPE: CPT | Performed by: INTERNAL MEDICINE

## 2020-11-06 PROCEDURE — 87640 STAPH A DNA AMP PROBE: CPT

## 2020-11-06 PROCEDURE — 36415 COLL VENOUS BLD VENIPUNCTURE: CPT | Performed by: INTERNAL MEDICINE

## 2020-11-06 PROCEDURE — 85610 PROTHROMBIN TIME: CPT | Performed by: INTERNAL MEDICINE

## 2020-11-06 PROCEDURE — 93010 ELECTROCARDIOGRAM REPORT: CPT | Performed by: INTERNAL MEDICINE

## 2020-11-06 PROCEDURE — 87086 URINE CULTURE/COLONY COUNT: CPT | Performed by: INTERNAL MEDICINE

## 2020-11-06 PROCEDURE — 3077F SYST BP >= 140 MM HG: CPT | Performed by: INTERNAL MEDICINE

## 2020-11-06 PROCEDURE — 93005 ELECTROCARDIOGRAM TRACING: CPT | Performed by: INTERNAL MEDICINE

## 2020-11-06 NOTE — PROGRESS NOTES
Uriel Avalos is a 72year old male. HPI:   Patient presents with:  Pre-Op: right hip replacement 12/1     Patient is here for preop evaluation. He will be having a right hip replacement December 1. His right hip area has been hurting him.   This is otherwise  RESPIRATORY:  Voices no shortness of breath with exertion or cough  CARDIOVASCULAR:  Voices no chest pain on exertion or shortness of breath  GI:   Voices no abdominal pain or changes of bowels   :Viices no urning or frequency of urination.   Coco Kelly PLATELET  - COMP METABOLIC PANEL (14)  - MRSA/SA SCRN BY PCR:EMERG ORTHO SURG ONLY; Future  - PTT, ACTIVATED  - PROTHROMBIN TIME (PT)  - URINALYSIS, ROUTINE  - URINE CULTURE, ROUTINE  - TYPE AND SCREEN; Future    2.  Essential hypertension  Blood pressure s

## 2020-11-06 NOTE — TELEPHONE ENCOUNTER
Letter along with office note and EKG faxed to DR. Jacobson at  [phone]    Faxed twice - no confirmation . Called DR. Soledad Joseph office they will send a message to his assistant and if they did not receive anything give our office a call back    Confirmation

## 2020-11-06 NOTE — TELEPHONE ENCOUNTER
To Dr. Calvin Keller-- lab called to report positive staph aureus screen by PCR. Please advise, thanks!

## 2020-11-06 NOTE — TELEPHONE ENCOUNTER
Please fax my letter along with office visit note and EKGs that I placed in outbox to Dr. Katharine Smith.   Fax number [phone]

## 2020-11-09 ENCOUNTER — TELEPHONE (OUTPATIENT)
Dept: INTERNAL MEDICINE CLINIC | Facility: CLINIC | Age: 65
End: 2020-11-09

## 2020-11-09 ENCOUNTER — HOSPITAL ENCOUNTER (OUTPATIENT)
Dept: GENERAL RADIOLOGY | Age: 65
Discharge: HOME | End: 2020-11-09
Attending: INTERNAL MEDICINE
Payer: COMMERCIAL

## 2020-11-09 DIAGNOSIS — I10 ESSENTIAL HYPERTENSION: ICD-10-CM

## 2020-11-09 DIAGNOSIS — Z01.818 PRE-OP EVALUATION: ICD-10-CM

## 2020-11-09 PROCEDURE — 71046 X-RAY EXAM CHEST 2 VIEWS: CPT | Performed by: INTERNAL MEDICINE

## 2020-11-09 NOTE — TELEPHONE ENCOUNTER
Please call  office at  and ask for fax of preadmission testing that he requires. I misplaced the one that was generated for me. Thank you.   Patient has right hip replacement scheduled for December 1

## 2020-11-09 NOTE — TELEPHONE ENCOUNTER
Called  office and staff confirmed they will be faxing over Pre op paperwork. Will place paperwork in  inbox when received.

## 2020-11-10 NOTE — TELEPHONE ENCOUNTER
Please call patient and let him know that his chest x-ray looks good. His blood look good. However his nasal swab did show some staph bacteria.   This is not unusual.  This is taking care of by putting an ointment in both naris area twice a day for 5 days

## 2020-11-11 NOTE — TELEPHONE ENCOUNTER
Pt notified cxr and blood work looks good / DR KRISHNAN   However nasa swab did show some staph bacteria   - not unusual   - to use nasal ointment bid x 5 days - [facility]  Directions will be on prod  To coplete before surgery   Awaiting reponse from DR Caitlin Benedict

## 2020-11-12 ENCOUNTER — OFFICE VISIT (OUTPATIENT)
Dept: CARDIOLOGY | Age: 65
End: 2020-11-12

## 2020-11-12 VITALS
WEIGHT: 216 LBS | DIASTOLIC BLOOD PRESSURE: 80 MMHG | OXYGEN SATURATION: 98 % | HEART RATE: 84 BPM | HEIGHT: 69 IN | RESPIRATION RATE: 18 BRPM | SYSTOLIC BLOOD PRESSURE: 138 MMHG | BODY MASS INDEX: 31.99 KG/M2

## 2020-11-12 DIAGNOSIS — R93.1 ABNORMAL HEART SCORE CT: Primary | ICD-10-CM

## 2020-11-12 PROCEDURE — 3079F DIAST BP 80-89 MM HG: CPT | Performed by: NURSE PRACTITIONER

## 2020-11-12 PROCEDURE — 99214 OFFICE O/P EST MOD 30 MIN: CPT | Performed by: NURSE PRACTITIONER

## 2020-11-12 PROCEDURE — 3075F SYST BP GE 130 - 139MM HG: CPT | Performed by: NURSE PRACTITIONER

## 2020-11-12 RX ORDER — ATORVASTATIN CALCIUM 20 MG/1
20 TABLET, FILM COATED ORAL DAILY
COMMUNITY

## 2020-11-12 SDOH — HEALTH STABILITY: MENTAL HEALTH: HOW OFTEN DO YOU HAVE A DRINK CONTAINING ALCOHOL?: 2-4 TIMES A MONTH

## 2020-11-12 ASSESSMENT — PATIENT HEALTH QUESTIONNAIRE - PHQ9
2. FEELING DOWN, DEPRESSED OR HOPELESS: NOT AT ALL
SUM OF ALL RESPONSES TO PHQ9 QUESTIONS 1 AND 2: 0
SUM OF ALL RESPONSES TO PHQ9 QUESTIONS 1 AND 2: 0
CLINICAL INTERPRETATION OF PHQ2 SCORE: NO FURTHER SCREENING NEEDED
CLINICAL INTERPRETATION OF PHQ9 SCORE: NO FURTHER SCREENING NEEDED
1. LITTLE INTEREST OR PLEASURE IN DOING THINGS: NOT AT ALL

## 2020-11-13 NOTE — TELEPHONE ENCOUNTER
Please make follow-up phone call and let patient know that I am faxing his preoperative clearance to . I see that he saw Galen Best RN for cardiology yesterday. She gave clearance.   Please ask him if he has picked up his mupirocin nasal o

## 2020-11-27 RX ORDER — ASPIRIN 81 MG/1
81 TABLET ORAL DAILY
Status: ON HOLD | COMMUNITY
End: 2020-12-02

## 2020-11-28 ENCOUNTER — APPOINTMENT (OUTPATIENT)
Dept: LAB | Facility: HOSPITAL | Age: 65
End: 2020-11-28
Attending: ORTHOPAEDIC SURGERY
Payer: COMMERCIAL

## 2020-11-28 DIAGNOSIS — Z01.818 PREOP TESTING: ICD-10-CM

## 2020-12-01 ENCOUNTER — APPOINTMENT (OUTPATIENT)
Dept: GENERAL RADIOLOGY | Facility: HOSPITAL | Age: 65
DRG: 470 | End: 2020-12-01
Attending: ORTHOPAEDIC SURGERY
Payer: COMMERCIAL

## 2020-12-01 ENCOUNTER — ANESTHESIA (OUTPATIENT)
Dept: SURGERY | Facility: HOSPITAL | Age: 65
DRG: 470 | End: 2020-12-01
Payer: COMMERCIAL

## 2020-12-01 ENCOUNTER — HOSPITAL ENCOUNTER (INPATIENT)
Facility: HOSPITAL | Age: 65
LOS: 1 days | Discharge: HOME | DRG: 470 | End: 2020-12-02
Attending: ORTHOPAEDIC SURGERY | Admitting: ORTHOPAEDIC SURGERY
Payer: COMMERCIAL

## 2020-12-01 ENCOUNTER — APPOINTMENT (OUTPATIENT)
Dept: GENERAL RADIOLOGY | Facility: HOSPITAL | Age: 65
DRG: 470 | End: 2020-12-01
Attending: PHYSICIAN ASSISTANT
Payer: COMMERCIAL

## 2020-12-01 ENCOUNTER — ANESTHESIA EVENT (OUTPATIENT)
Dept: SURGERY | Facility: HOSPITAL | Age: 65
DRG: 470 | End: 2020-12-01
Payer: COMMERCIAL

## 2020-12-01 DIAGNOSIS — Z01.818 PREOP TESTING: Primary | ICD-10-CM

## 2020-12-01 PROBLEM — M16.11 PRIMARY OSTEOARTHRITIS OF RIGHT HIP: Status: ACTIVE | Noted: 2020-12-01

## 2020-12-01 PROCEDURE — 99232 SBSQ HOSP IP/OBS MODERATE 35: CPT | Performed by: HOSPITALIST

## 2020-12-01 PROCEDURE — 76000 FLUOROSCOPY <1 HR PHYS/QHP: CPT | Performed by: ORTHOPAEDIC SURGERY

## 2020-12-01 PROCEDURE — 73502 X-RAY EXAM HIP UNI 2-3 VIEWS: CPT | Performed by: PHYSICIAN ASSISTANT

## 2020-12-01 PROCEDURE — 0SR904A REPLACEMENT OF RIGHT HIP JOINT WITH CERAMIC ON POLYETHYLENE SYNTHETIC SUBSTITUTE, UNCEMENTED, OPEN APPROACH: ICD-10-PCS | Performed by: ORTHOPAEDIC SURGERY

## 2020-12-01 DEVICE — TOTAL HIP W/CER HEAD: Type: IMPLANTABLE DEVICE | Status: FUNCTIONAL

## 2020-12-01 DEVICE — FLAT PE  HC LINER Ø 32 / E
Type: IMPLANTABLE DEVICE | Site: HIP | Status: FUNCTIONAL
Brand: MPACT ACETABULAR SYSTEM

## 2020-12-01 DEVICE — CANCELLOUS BONE SCREW FLAT HEAD Ø 6,5 L30
Type: IMPLANTABLE DEVICE | Site: HIP | Status: FUNCTIONAL
Brand: MPACT ACETABULAR SYSTEM

## 2020-12-01 RX ORDER — NALOXONE HYDROCHLORIDE 0.4 MG/ML
80 INJECTION, SOLUTION INTRAMUSCULAR; INTRAVENOUS; SUBCUTANEOUS AS NEEDED
Status: DISCONTINUED | OUTPATIENT
Start: 2020-12-01 | End: 2020-12-01 | Stop reason: HOSPADM

## 2020-12-01 RX ORDER — TRANEXAMIC ACID 10 MG/ML
INJECTION, SOLUTION INTRAVENOUS AS NEEDED
Status: DISCONTINUED | OUTPATIENT
Start: 2020-12-01 | End: 2020-12-01 | Stop reason: SURG

## 2020-12-01 RX ORDER — SODIUM PHOSPHATE, DIBASIC AND SODIUM PHOSPHATE, MONOBASIC 7; 19 G/133ML; G/133ML
1 ENEMA RECTAL ONCE AS NEEDED
Status: DISCONTINUED | OUTPATIENT
Start: 2020-12-01 | End: 2020-12-02

## 2020-12-01 RX ORDER — DOCUSATE SODIUM 100 MG/1
100 CAPSULE, LIQUID FILLED ORAL 2 TIMES DAILY
Status: DISCONTINUED | OUTPATIENT
Start: 2020-12-01 | End: 2020-12-02

## 2020-12-01 RX ORDER — CELECOXIB 200 MG/1
400 CAPSULE ORAL ONCE
Status: COMPLETED | OUTPATIENT
Start: 2020-12-01 | End: 2020-12-01

## 2020-12-01 RX ORDER — ACETAMINOPHEN 500 MG
1000 TABLET ORAL ONCE
Status: DISCONTINUED | OUTPATIENT
Start: 2020-12-01 | End: 2020-12-01

## 2020-12-01 RX ORDER — HYDROMORPHONE HYDROCHLORIDE 1 MG/ML
0.2 INJECTION, SOLUTION INTRAMUSCULAR; INTRAVENOUS; SUBCUTANEOUS EVERY 5 MIN PRN
Status: DISCONTINUED | OUTPATIENT
Start: 2020-12-01 | End: 2020-12-01 | Stop reason: HOSPADM

## 2020-12-01 RX ORDER — ONDANSETRON 2 MG/ML
4 INJECTION INTRAMUSCULAR; INTRAVENOUS EVERY 4 HOURS PRN
Status: DISCONTINUED | OUTPATIENT
Start: 2020-12-01 | End: 2020-12-02

## 2020-12-01 RX ORDER — DIPHENHYDRAMINE HCL 25 MG
25 CAPSULE ORAL EVERY 4 HOURS PRN
Status: ACTIVE | OUTPATIENT
Start: 2020-12-01 | End: 2020-12-02

## 2020-12-01 RX ORDER — MELATONIN
325
Status: DISCONTINUED | OUTPATIENT
Start: 2020-12-02 | End: 2020-12-02

## 2020-12-01 RX ORDER — ATORVASTATIN CALCIUM 20 MG/1
20 TABLET, FILM COATED ORAL NIGHTLY
Status: DISCONTINUED | OUTPATIENT
Start: 2020-12-01 | End: 2020-12-02

## 2020-12-01 RX ORDER — DIPHENHYDRAMINE HCL 25 MG
25 CAPSULE ORAL EVERY 4 HOURS PRN
Status: DISCONTINUED | OUTPATIENT
Start: 2020-12-01 | End: 2020-12-02

## 2020-12-01 RX ORDER — VANCOMYCIN HYDROCHLORIDE
15 ONCE
Status: COMPLETED | OUTPATIENT
Start: 2020-12-01 | End: 2020-12-01

## 2020-12-01 RX ORDER — PROCHLORPERAZINE EDISYLATE 5 MG/ML
10 INJECTION INTRAMUSCULAR; INTRAVENOUS EVERY 6 HOURS PRN
Status: DISCONTINUED | OUTPATIENT
Start: 2020-12-01 | End: 2020-12-02

## 2020-12-01 RX ORDER — ACETAMINOPHEN 325 MG/1
650 TABLET ORAL EVERY 6 HOURS PRN
Status: ACTIVE | OUTPATIENT
Start: 2020-12-01 | End: 2020-12-02

## 2020-12-01 RX ORDER — HYDROCODONE BITARTRATE AND ACETAMINOPHEN 5; 325 MG/1; MG/1
1 TABLET ORAL EVERY 4 HOURS PRN
Status: DISCONTINUED | OUTPATIENT
Start: 2020-12-01 | End: 2020-12-02

## 2020-12-01 RX ORDER — HYDROMORPHONE HYDROCHLORIDE 1 MG/ML
0.4 INJECTION, SOLUTION INTRAMUSCULAR; INTRAVENOUS; SUBCUTANEOUS EVERY 5 MIN PRN
Status: DISCONTINUED | OUTPATIENT
Start: 2020-12-01 | End: 2020-12-01 | Stop reason: HOSPADM

## 2020-12-01 RX ORDER — MORPHINE SULFATE 10 MG/ML
6 INJECTION, SOLUTION INTRAMUSCULAR; INTRAVENOUS EVERY 10 MIN PRN
Status: DISCONTINUED | OUTPATIENT
Start: 2020-12-01 | End: 2020-12-01 | Stop reason: HOSPADM

## 2020-12-01 RX ORDER — MORPHINE SULFATE 4 MG/ML
4 INJECTION, SOLUTION INTRAMUSCULAR; INTRAVENOUS EVERY 10 MIN PRN
Status: DISCONTINUED | OUTPATIENT
Start: 2020-12-01 | End: 2020-12-01 | Stop reason: HOSPADM

## 2020-12-01 RX ORDER — FAMOTIDINE 20 MG/1
20 TABLET ORAL 2 TIMES DAILY
Status: DISCONTINUED | OUTPATIENT
Start: 2020-12-01 | End: 2020-12-02

## 2020-12-01 RX ORDER — ENALAPRIL MALEATE 10 MG/1
10 TABLET ORAL EVERY MORNING
Status: DISCONTINUED | OUTPATIENT
Start: 2020-12-02 | End: 2020-12-02

## 2020-12-01 RX ORDER — HYDROCODONE BITARTRATE AND ACETAMINOPHEN 7.5; 325 MG/1; MG/1
2 TABLET ORAL EVERY 6 HOURS PRN
Status: ACTIVE | OUTPATIENT
Start: 2020-12-01 | End: 2020-12-02

## 2020-12-01 RX ORDER — HYDROCODONE BITARTRATE AND ACETAMINOPHEN 5; 325 MG/1; MG/1
2 TABLET ORAL AS NEEDED
Status: DISCONTINUED | OUTPATIENT
Start: 2020-12-01 | End: 2020-12-01 | Stop reason: HOSPADM

## 2020-12-01 RX ORDER — HYDROMORPHONE HYDROCHLORIDE 1 MG/ML
0.4 INJECTION, SOLUTION INTRAMUSCULAR; INTRAVENOUS; SUBCUTANEOUS
Status: ACTIVE | OUTPATIENT
Start: 2020-12-01 | End: 2020-12-02

## 2020-12-01 RX ORDER — METOCLOPRAMIDE 10 MG/1
10 TABLET ORAL ONCE
Status: COMPLETED | OUTPATIENT
Start: 2020-12-01 | End: 2020-12-01

## 2020-12-01 RX ORDER — DIPHENHYDRAMINE HYDROCHLORIDE 50 MG/ML
25 INJECTION INTRAMUSCULAR; INTRAVENOUS ONCE AS NEEDED
Status: ACTIVE | OUTPATIENT
Start: 2020-12-01 | End: 2020-12-01

## 2020-12-01 RX ORDER — MIDAZOLAM HYDROCHLORIDE 1 MG/ML
INJECTION INTRAMUSCULAR; INTRAVENOUS AS NEEDED
Status: DISCONTINUED | OUTPATIENT
Start: 2020-12-01 | End: 2020-12-01 | Stop reason: SURG

## 2020-12-01 RX ORDER — GABAPENTIN 600 MG/1
600 TABLET ORAL ONCE
Status: COMPLETED | OUTPATIENT
Start: 2020-12-01 | End: 2020-12-01

## 2020-12-01 RX ORDER — MORPHINE SULFATE 4 MG/ML
4 INJECTION, SOLUTION INTRAMUSCULAR; INTRAVENOUS EVERY 2 HOUR PRN
Status: DISCONTINUED | OUTPATIENT
Start: 2020-12-01 | End: 2020-12-02

## 2020-12-01 RX ORDER — MORPHINE SULFATE 2 MG/ML
2 INJECTION, SOLUTION INTRAMUSCULAR; INTRAVENOUS EVERY 2 HOUR PRN
Status: DISCONTINUED | OUTPATIENT
Start: 2020-12-01 | End: 2020-12-02

## 2020-12-01 RX ORDER — ACETAMINOPHEN 325 MG/1
650 TABLET ORAL EVERY 4 HOURS PRN
Status: DISCONTINUED | OUTPATIENT
Start: 2020-12-01 | End: 2020-12-02

## 2020-12-01 RX ORDER — HYDROCODONE BITARTRATE AND ACETAMINOPHEN 7.5; 325 MG/1; MG/1
1 TABLET ORAL EVERY 6 HOURS PRN
Status: ACTIVE | OUTPATIENT
Start: 2020-12-01 | End: 2020-12-02

## 2020-12-01 RX ORDER — PROCHLORPERAZINE EDISYLATE 5 MG/ML
5 INJECTION INTRAMUSCULAR; INTRAVENOUS ONCE AS NEEDED
Status: DISCONTINUED | OUTPATIENT
Start: 2020-12-01 | End: 2020-12-01 | Stop reason: HOSPADM

## 2020-12-01 RX ORDER — HYDROMORPHONE HYDROCHLORIDE 1 MG/ML
0.6 INJECTION, SOLUTION INTRAMUSCULAR; INTRAVENOUS; SUBCUTANEOUS EVERY 5 MIN PRN
Status: DISCONTINUED | OUTPATIENT
Start: 2020-12-01 | End: 2020-12-01 | Stop reason: HOSPADM

## 2020-12-01 RX ORDER — MORPHINE SULFATE 4 MG/ML
2 INJECTION, SOLUTION INTRAMUSCULAR; INTRAVENOUS EVERY 10 MIN PRN
Status: DISCONTINUED | OUTPATIENT
Start: 2020-12-01 | End: 2020-12-01 | Stop reason: HOSPADM

## 2020-12-01 RX ORDER — FAMOTIDINE 10 MG/ML
20 INJECTION, SOLUTION INTRAVENOUS 2 TIMES DAILY
Status: DISCONTINUED | OUTPATIENT
Start: 2020-12-01 | End: 2020-12-02

## 2020-12-01 RX ORDER — BISACODYL 10 MG
10 SUPPOSITORY, RECTAL RECTAL
Status: DISCONTINUED | OUTPATIENT
Start: 2020-12-01 | End: 2020-12-02

## 2020-12-01 RX ORDER — DIPHENHYDRAMINE HYDROCHLORIDE 50 MG/ML
12.5 INJECTION INTRAMUSCULAR; INTRAVENOUS EVERY 4 HOURS PRN
Status: ACTIVE | OUTPATIENT
Start: 2020-12-01 | End: 2020-12-02

## 2020-12-01 RX ORDER — PHENYLEPHRINE HCL 10 MG/ML
VIAL (ML) INJECTION AS NEEDED
Status: DISCONTINUED | OUTPATIENT
Start: 2020-12-01 | End: 2020-12-01 | Stop reason: SURG

## 2020-12-01 RX ORDER — MORPHINE SULFATE 2 MG/ML
1 INJECTION, SOLUTION INTRAMUSCULAR; INTRAVENOUS EVERY 2 HOUR PRN
Status: DISCONTINUED | OUTPATIENT
Start: 2020-12-01 | End: 2020-12-02

## 2020-12-01 RX ORDER — ASPIRIN 325 MG
325 TABLET ORAL 2 TIMES DAILY
Status: DISCONTINUED | OUTPATIENT
Start: 2020-12-01 | End: 2020-12-02

## 2020-12-01 RX ORDER — TRANEXAMIC ACID 10 MG/ML
1000 INJECTION, SOLUTION INTRAVENOUS ONCE
Status: DISCONTINUED | OUTPATIENT
Start: 2020-12-01 | End: 2020-12-02

## 2020-12-01 RX ORDER — HYDROCODONE BITARTRATE AND ACETAMINOPHEN 5; 325 MG/1; MG/1
1 TABLET ORAL AS NEEDED
Status: DISCONTINUED | OUTPATIENT
Start: 2020-12-01 | End: 2020-12-01 | Stop reason: HOSPADM

## 2020-12-01 RX ORDER — DEXTROSE, SODIUM CHLORIDE, AND POTASSIUM CHLORIDE 5; .45; .15 G/100ML; G/100ML; G/100ML
INJECTION INTRAVENOUS CONTINUOUS
Status: DISCONTINUED | OUTPATIENT
Start: 2020-12-01 | End: 2020-12-02

## 2020-12-01 RX ORDER — LIDOCAINE HYDROCHLORIDE 10 MG/ML
INJECTION, SOLUTION INFILTRATION; PERINEURAL
Status: COMPLETED | OUTPATIENT
Start: 2020-12-01 | End: 2020-12-01

## 2020-12-01 RX ORDER — CEFAZOLIN SODIUM/WATER 2 G/20 ML
2 SYRINGE (ML) INTRAVENOUS ONCE
Status: COMPLETED | OUTPATIENT
Start: 2020-12-01 | End: 2020-12-01

## 2020-12-01 RX ORDER — ONDANSETRON 2 MG/ML
4 INJECTION INTRAMUSCULAR; INTRAVENOUS ONCE AS NEEDED
Status: ACTIVE | OUTPATIENT
Start: 2020-12-01 | End: 2020-12-01

## 2020-12-01 RX ORDER — DIPHENHYDRAMINE HYDROCHLORIDE 50 MG/ML
12.5 INJECTION INTRAMUSCULAR; INTRAVENOUS EVERY 4 HOURS PRN
Status: DISCONTINUED | OUTPATIENT
Start: 2020-12-01 | End: 2020-12-01

## 2020-12-01 RX ORDER — GLYCOPYRROLATE 0.2 MG/ML
INJECTION, SOLUTION INTRAMUSCULAR; INTRAVENOUS AS NEEDED
Status: DISCONTINUED | OUTPATIENT
Start: 2020-12-01 | End: 2020-12-01 | Stop reason: SURG

## 2020-12-01 RX ORDER — PROCHLORPERAZINE EDISYLATE 5 MG/ML
5 INJECTION INTRAMUSCULAR; INTRAVENOUS ONCE AS NEEDED
Status: ACTIVE | OUTPATIENT
Start: 2020-12-01 | End: 2020-12-01

## 2020-12-01 RX ORDER — POLYETHYLENE GLYCOL 3350 17 G/17G
17 POWDER, FOR SOLUTION ORAL DAILY PRN
Status: DISCONTINUED | OUTPATIENT
Start: 2020-12-01 | End: 2020-12-02

## 2020-12-01 RX ORDER — SENNOSIDES 8.6 MG
17.2 TABLET ORAL NIGHTLY
Status: DISCONTINUED | OUTPATIENT
Start: 2020-12-01 | End: 2020-12-02

## 2020-12-01 RX ORDER — CELECOXIB 200 MG/1
200 CAPSULE ORAL EVERY 12 HOURS SCHEDULED
Status: DISCONTINUED | OUTPATIENT
Start: 2020-12-01 | End: 2020-12-02

## 2020-12-01 RX ORDER — HALOPERIDOL 5 MG/ML
0.5 INJECTION INTRAMUSCULAR ONCE AS NEEDED
Status: ACTIVE | OUTPATIENT
Start: 2020-12-01 | End: 2020-12-01

## 2020-12-01 RX ORDER — SODIUM CHLORIDE, SODIUM LACTATE, POTASSIUM CHLORIDE, CALCIUM CHLORIDE 600; 310; 30; 20 MG/100ML; MG/100ML; MG/100ML; MG/100ML
INJECTION, SOLUTION INTRAVENOUS CONTINUOUS
Status: DISCONTINUED | OUTPATIENT
Start: 2020-12-01 | End: 2020-12-01 | Stop reason: HOSPADM

## 2020-12-01 RX ORDER — SODIUM CHLORIDE, SODIUM LACTATE, POTASSIUM CHLORIDE, CALCIUM CHLORIDE 600; 310; 30; 20 MG/100ML; MG/100ML; MG/100ML; MG/100ML
INJECTION, SOLUTION INTRAVENOUS CONTINUOUS
Status: DISCONTINUED | OUTPATIENT
Start: 2020-12-01 | End: 2020-12-02

## 2020-12-01 RX ORDER — MORPHINE SULFATE 1 MG/ML
INJECTION, SOLUTION EPIDURAL; INTRATHECAL; INTRAVENOUS
Status: COMPLETED | OUTPATIENT
Start: 2020-12-01 | End: 2020-12-01

## 2020-12-01 RX ORDER — HYDROMORPHONE HYDROCHLORIDE 1 MG/ML
0.6 INJECTION, SOLUTION INTRAMUSCULAR; INTRAVENOUS; SUBCUTANEOUS
Status: ACTIVE | OUTPATIENT
Start: 2020-12-01 | End: 2020-12-02

## 2020-12-01 RX ORDER — FAMOTIDINE 20 MG/1
20 TABLET ORAL ONCE
Status: COMPLETED | OUTPATIENT
Start: 2020-12-01 | End: 2020-12-01

## 2020-12-01 RX ORDER — BUPIVACAINE HYDROCHLORIDE 7.5 MG/ML
INJECTION, SOLUTION INTRASPINAL
Status: COMPLETED | OUTPATIENT
Start: 2020-12-01 | End: 2020-12-01

## 2020-12-01 RX ORDER — HYDROCODONE BITARTRATE AND ACETAMINOPHEN 5; 325 MG/1; MG/1
2 TABLET ORAL EVERY 4 HOURS PRN
Status: DISCONTINUED | OUTPATIENT
Start: 2020-12-01 | End: 2020-12-02

## 2020-12-01 RX ORDER — ONDANSETRON 2 MG/ML
4 INJECTION INTRAMUSCULAR; INTRAVENOUS ONCE AS NEEDED
Status: DISCONTINUED | OUTPATIENT
Start: 2020-12-01 | End: 2020-12-01 | Stop reason: HOSPADM

## 2020-12-01 RX ORDER — ACETAMINOPHEN 500 MG
1000 TABLET ORAL ONCE
Status: COMPLETED | OUTPATIENT
Start: 2020-12-01 | End: 2020-12-01

## 2020-12-01 RX ORDER — NALOXONE HYDROCHLORIDE 0.4 MG/ML
0.08 INJECTION, SOLUTION INTRAMUSCULAR; INTRAVENOUS; SUBCUTANEOUS
Status: ACTIVE | OUTPATIENT
Start: 2020-12-01 | End: 2020-12-02

## 2020-12-01 RX ADMIN — SODIUM CHLORIDE, SODIUM LACTATE, POTASSIUM CHLORIDE, CALCIUM CHLORIDE: 600; 310; 30; 20 INJECTION, SOLUTION INTRAVENOUS at 12:18:00

## 2020-12-01 RX ADMIN — PHENYLEPHRINE HCL 200 MCG: 10 MG/ML VIAL (ML) INJECTION at 11:12:00

## 2020-12-01 RX ADMIN — MORPHINE SULFATE 0.2 MG: 1 INJECTION, SOLUTION EPIDURAL; INTRATHECAL; INTRAVENOUS at 10:11:00

## 2020-12-01 RX ADMIN — PHENYLEPHRINE HCL 200 MCG: 10 MG/ML VIAL (ML) INJECTION at 10:31:00

## 2020-12-01 RX ADMIN — CEFAZOLIN SODIUM/WATER 2 G: 2 G/20 ML SYRINGE (ML) INTRAVENOUS at 10:28:00

## 2020-12-01 RX ADMIN — PHENYLEPHRINE HCL 200 MCG: 10 MG/ML VIAL (ML) INJECTION at 10:41:00

## 2020-12-01 RX ADMIN — SODIUM CHLORIDE, SODIUM LACTATE, POTASSIUM CHLORIDE, CALCIUM CHLORIDE: 600; 310; 30; 20 INJECTION, SOLUTION INTRAVENOUS at 11:57:00

## 2020-12-01 RX ADMIN — PHENYLEPHRINE HCL 200 MCG: 10 MG/ML VIAL (ML) INJECTION at 11:45:00

## 2020-12-01 RX ADMIN — LIDOCAINE HYDROCHLORIDE 3 ML: 10 INJECTION, SOLUTION INFILTRATION; PERINEURAL at 10:11:00

## 2020-12-01 RX ADMIN — PHENYLEPHRINE HCL 200 MCG: 10 MG/ML VIAL (ML) INJECTION at 11:55:00

## 2020-12-01 RX ADMIN — MIDAZOLAM HYDROCHLORIDE 2 MG: 1 INJECTION INTRAMUSCULAR; INTRAVENOUS at 10:04:00

## 2020-12-01 RX ADMIN — PHENYLEPHRINE HCL 200 MCG: 10 MG/ML VIAL (ML) INJECTION at 12:04:00

## 2020-12-01 RX ADMIN — PHENYLEPHRINE HCL 200 MCG: 10 MG/ML VIAL (ML) INJECTION at 10:53:00

## 2020-12-01 RX ADMIN — GLYCOPYRROLATE 0.1 MG: 0.2 INJECTION, SOLUTION INTRAMUSCULAR; INTRAVENOUS at 11:30:00

## 2020-12-01 RX ADMIN — TRANEXAMIC ACID 1000 MG: 10 INJECTION, SOLUTION INTRAVENOUS at 10:43:00

## 2020-12-01 RX ADMIN — BUPIVACAINE HYDROCHLORIDE 1.5 ML: 7.5 INJECTION, SOLUTION INTRASPINAL at 10:11:00

## 2020-12-01 RX ADMIN — PHENYLEPHRINE HCL 200 MCG: 10 MG/ML VIAL (ML) INJECTION at 11:04:00

## 2020-12-01 NOTE — ANESTHESIA PROCEDURE NOTES
Spinal Block    Date/Time: 12/1/2020 10:11 AM  Performed by: Will Preston CRNA  Authorized by: Dari Swanson MD       General Information and Staff    Start Time:  12/1/2020 10:05 AM  End Time:  12/1/2020 10:11 AM  Anesthesiologist:  Dari Swanson

## 2020-12-01 NOTE — OPERATIVE REPORT
OPERATIVE REPORT     PREOPERATIVE DIAGNOSIS:  Osteoarthritis, right hip. POSTOPERATIVE DIAGNOSIS:  Osteoarthritis, right hip. PROCEDURE:  right total hip arthroplasty via anterior approach with C-arm control.      ASSISTANT:  Risa Espinoza PA-C. were verified with the C-arm. A Runcom Mpact 2-hole acetabulum was inserted in the appropriate version and inclination and obtained a good press-fit. A screw was placed in the ilium for supplementary fixation and obtained excellent purchase.   A highly c

## 2020-12-01 NOTE — CM/SW NOTE
SW received MDO for Surgery. SW spoke with pt over the phone to complete initial assessment. SW confirms pt's address and living situation. Pt lives in a 2 level home with 1 step in. Pt currently lives alone.  Pt was here last year and had transitioned

## 2020-12-01 NOTE — PHYSICAL THERAPY NOTE
PHYSICAL THERAPY HIP EVALUATION - INPATIENT     Room Number: SDSOF19/JUECY64-E  Evaluation Date: 12/1/2020  Type of Evaluation: Initial  Physician Order: PT Eval and Treat    Presenting Problem: R MIMA  Reason for Therapy: Mobility Dysfunction and Discharge right hip pain. He has a history of osteoarthritis in that hip.  He's very pleased with his left hip replacement that he had in May.  He's having pain on the right side.  Some days are good and some bad.  Sometimes he doesn't walk with his kids because of O2 WALK                  AM-PAC '6-Clicks' INPATIENT SHORT FORM - BASIC MOBILITY  How much difficulty does the patient currently have. ..  -   Turning over in bed (including adjusting bedclothes, sheets and blankets)?: A Little   -   Sitting do supervision   Goal #4   Current Status    Goal #5 Patient verbalizes and/or demonstrates all precautions and safety concerns independently   Goal #5   Current Status    Goal #6 Patient independently performs home exercise program for ROM/strengthening per

## 2020-12-01 NOTE — ANESTHESIA POSTPROCEDURE EVALUATION
Patient: Aj Gifford    Procedure Summary     Date: 12/01/20 Room / Location: [facility] OR  / [facility] OR    Anesthesia Start: 7142 Anesthesia Stop: 1226    Procedure: HIP TOTAL ANTERIOR APPROACH (Right Hip) Diagnosis: (osteoarthritis right hip)

## 2020-12-01 NOTE — BRIEF OP NOTE
Pre-Operative Diagnosis: osteoarthritis right hip     Post-Operative Diagnosis: osteoarthritis right hip      Procedure Performed:   Procedure(s):  right total hip arthroplasty anterior approach    Surgeon(s) and Role:     * Dre Junior MD - Pr

## 2020-12-01 NOTE — ANESTHESIA PREPROCEDURE EVALUATION
Anesthesia PreOp Note    HPI:     Uriel Avalos is a 72year old male who presents for preoperative consultation requested by: Maria D Espinoza MD    Date of Surgery: 12/1/2020    Procedure(s):  HIP TOTAL ANTERIOR APPROACH  Indication: Lucho Gonzalez 81 mg by mouth daily. , Disp: , Rfl: , 11/24/2020    •  mupirocin 2 % External Ointment, Apply a small amount on a cotton tip in each naris twice a day for 5 days. , Disp: 1 Tube, Rfl: 0, 11/29/2020    •  atorvastatin 20 MG Oral Tab, Take 1 tablet (20 mg tot Comment: 2-3 drinks weekly      Drug use: No      Sexual activity: Not on file    Lifestyle      Physical activity        Days per week: Not on file        Minutes per session: Not on file      Stress: Not on file    Relationships      Social [facility] [place] 9.4 11/06/2020          Vital Signs: Body mass index is 31.24 kg/m². height is 1.753 m (5' 9\") and weight is 96 kg (211 lb 9 oz). His oral temperature is 98.4 °F (36.9 °C). His blood pressure is 150/83 and his pulse is 97.  His respiration is 20 and

## 2020-12-01 NOTE — HOME CARE LIAISON
Received referral from Kacy for [facility]. Unable to accept due to staffing. Re-referred to [facility] per Kacy request. Also referred to Sonja Guzman due to pt's insurance.

## 2020-12-01 NOTE — INTERVAL H&P NOTE
Pre-op Diagnosis: osteoarthritis right hip    The above referenced H&P was reviewed by Marita Catalan MD on 12/1/2020, the patient was examined and no significant changes have occurred in the patient's condition since the H&P was performed.   I disc

## 2020-12-01 NOTE — PROGRESS NOTES
[facility] HOSP - [facility]    Progress Note    General [facility] Patient Status:  Inpatient    1955 MRN [id number]   Location [address] UNIT Attending Mabel Morales MD   Hosp Day # 0 PCP Tammy Castleman, MD        Sub hypertension  CONT HOME MEDS, MONITOR.              Results:     Lab Results   Component Value Date    WBC 6.1 11/06/2020    HGB 13.9 11/06/2020    HCT 42.1 11/06/2020    .0 11/06/2020    CREATSERUM 1.00 11/06/2020    BUN 17 11/06/2020     11/0

## 2020-12-02 VITALS
HEIGHT: 69 IN | RESPIRATION RATE: 16 BRPM | SYSTOLIC BLOOD PRESSURE: 110 MMHG | WEIGHT: 211.56 LBS | BODY MASS INDEX: 31.33 KG/M2 | HEART RATE: 102 BPM | TEMPERATURE: 98 F | DIASTOLIC BLOOD PRESSURE: 77 MMHG | OXYGEN SATURATION: 98 %

## 2020-12-02 PROCEDURE — 99239 HOSP IP/OBS DSCHRG MGMT >30: CPT | Performed by: HOSPITALIST

## 2020-12-02 RX ORDER — HYDROCODONE BITARTRATE AND ACETAMINOPHEN 5; 325 MG/1; MG/1
1 TABLET ORAL EVERY 4 HOURS PRN
Qty: 40 TABLET | Refills: 0 | Status: SHIPPED | OUTPATIENT
Start: 2020-12-02 | End: 2021-08-30

## 2020-12-02 RX ORDER — MELATONIN
325
Qty: 30 TABLET | Refills: 0 | Status: SHIPPED | OUTPATIENT
Start: 2020-12-03 | End: 2021-08-30

## 2020-12-02 RX ORDER — ASPIRIN 325 MG
325 TABLET ORAL 2 TIMES DAILY
Qty: 60 TABLET | Refills: 0 | Status: SHIPPED | OUTPATIENT
Start: 2020-12-02 | End: 2021-08-30

## 2020-12-02 RX ORDER — CELECOXIB 200 MG/1
200 CAPSULE ORAL EVERY 12 HOURS SCHEDULED
Qty: 60 CAPSULE | Refills: 0 | Status: SHIPPED | OUTPATIENT
Start: 2020-12-02 | End: 2021-08-30

## 2020-12-02 NOTE — PROGRESS NOTES
[facility]  Progress Note     Melanie Carrillo  : 1955    Status: Inpatient  Day #: 1    Attending: Jada Valentine MD  PCP: Zina Orona MD      Assessment and Plan     OA R hip  -s/p R hip anterior approach arthroplasty  -pain control 12:15 PM     Finalized by (CST): James Chawla MD on 12/01/2020 at 12:15 PM          Xr Hip W Or Wo Pelvis 2 Or 3 Views, Right (cpt=73502)    Result Date: 12/1/2020  CONCLUSION: Status post total right hip arthroplasty.     Dictated by (CST): Sandee Crespo

## 2020-12-02 NOTE — PHYSICAL THERAPY NOTE
PHYSICAL THERAPY HIP TREATMENT NOTE - INPATIENT     Room Number: 439/439-A       Presenting Problem: R MIMA    Problem List  Principal Problem:    Primary osteoarthritis of right hip  Active Problems:    Hyperlipidemia    Essential hypertension      PHYSICA (including adjusting bedclothes, sheets and blankets)?: A Little   -   Sitting down on and standing up from a chair with arms (e.g., wheelchair, bedside commode, etc.): None   -   Moving from lying on back to sitting on the side of the bed?: None   How muc independently   Goal #5   Current Status met   Goal #6 Patient independently performs home exercise program for ROM/strengthening per the instructions provided in preparation for discharge.    Goal #6  Current Status In pogress

## 2020-12-02 NOTE — PLAN OF CARE
Patient alert and oriented x4. Up with standby assist. General diet, tolerating well. Reports mild soreness, but declines need for pain medication. Staley removed this AM, due to void. Worked with OT and PT. Dressing to Right hip clean/dry/intact.  Plan for FALL  Goal: Free from fall injury  Description: INTERVENTIONS:  - Assess pt frequently for physical needs  - Identify cognitive and physical deficits and behaviors that affect risk of falls.   - [place] fall precautions as indicated by assessment.  - Educ

## 2020-12-02 NOTE — DISCHARGE SUMMARY
[facility]  Discharge Summary     Melanie Carrillo  : 1955    Status: Inpatient  Day #: 1    Attending: Jada Valentine MD  PCP: Zina Orona MD     Date of Admission:  2020  Date of Discharge:  2020     Hospital Discharge Angelita Caps  Commonly known as: CeleBREX      Take 1 capsule (200 mg total) by mouth every 12 (twelve) hours.    Quantity: 60 capsule  Refills: 0     ferrous sulfate 325 (65 FE) MG Tbec  Start taking on: December 3, 2020      Take 1 tablet (325 mg total) by mouth treatment and discharge plans.        Thomas Callahan MD

## 2020-12-02 NOTE — OCCUPATIONAL THERAPY NOTE
OCCUPATIONAL THERAPY EVALUATION - INPATIENT     Room Number: 439/439-A  Evaluation Date: 12/2/2020  Type of Evaluation: Initial  Presenting Problem: (s/p R MIMA)    Physician Order: IP Consult to Occupational Therapy  Reason for Therapy: ADL/IADL Dysfunct COLONOSCOPY  03/2019   • COLONOSCOPY, POSSIBLE BIOPSY, POSSIBLE POLYPECTOMY 65899 N/A 6/16/2016    Performed by Idalia Howard MD at [facility]   • EXCISE VARICOCELE     • HIP TOTAL ANTERIOR APPROACH Left 4/30/2019    Performed by [address] such as brushing teeth?: None  -   Eating meals?: None    AM-PAC Score:  Score: 24  Approx Degree of Impairment: 0%  Standardized Score (AM-PAC Scale): 57.54  CMS Modifier (G-Code): CH    FUNCTIONAL TRANSFER ASSESSMENT  Supine to Sit : Supervision  Sit to

## 2020-12-02 NOTE — ANESTHESIA POST-OP FOLLOW-UP NOTE
[facility]  Anesthesiology Pain Management Progress Note      Patient name: Yohannes Nuñez 72year old male  : 1955  MRN: [id number]    Diagnosis: Right hip primary osteoarthritis     Reason for Consult: Intrathecal duramorph fol

## 2020-12-02 NOTE — PLAN OF CARE
Transferred to 439 from pre-op. Oriented to room and safety precautions. Right hip incision with wound vac in place, clean/dry/intact. Denies pain. Staley with good output. Bed in lowest position, call light in reach, fall precautions in place.  Plan to disc

## 2020-12-02 NOTE — CM/SW NOTE
Patient discussed in rounds, discharge today. PT/OT recommending outpatient. Met with patient, confirmed plan. He will f/u with [facility] [place] for outpatient therapy. No d/c needs.     Ellie Delgado, [address]

## 2020-12-18 ENCOUNTER — TELEPHONE (OUTPATIENT)
Dept: INTERNAL MEDICINE CLINIC | Facility: CLINIC | Age: 65
End: 2020-12-18

## 2020-12-18 DIAGNOSIS — D50.0 BLOOD LOSS ANEMIA: Primary | ICD-10-CM

## 2020-12-18 NOTE — TELEPHONE ENCOUNTER
Please call patient and let him know that I realize he had his hip replacement. I hope he is doing well. Usually after hip replacement her blood count decreases a little bit as his did.   Not in a very dangerous level but I would like to repeat a CBC coun

## 2020-12-28 RX ORDER — ASPIRIN 325 MG/1
TABLET, COATED ORAL
Qty: 60 TABLET | Refills: 0 | OUTPATIENT
Start: 2020-12-28

## 2021-01-22 RX ORDER — ENALAPRIL MALEATE 10 MG/1
10 TABLET ORAL EVERY MORNING
Qty: 90 TABLET | Refills: 3 | Status: SHIPPED | OUTPATIENT
Start: 2021-01-22 | End: 2022-01-28

## 2021-01-22 NOTE — TELEPHONE ENCOUNTER
Pt called  He has been out of enalapril since Tuesday  [facility] & never [facility]    Requests [facility] today

## 2021-01-31 LAB — AMB EXT COVID-19 RESULT: DETECTED

## 2021-02-12 ENCOUNTER — TELEPHONE (OUTPATIENT)
Dept: INTERNAL MEDICINE CLINIC | Facility: CLINIC | Age: 66
End: 2021-02-12

## 2021-02-12 NOTE — TELEPHONE ENCOUNTER
Please call pt  When would Dr Gerber Iniguez want to see him again?   Pt was seen for pre-surgical in November  Not sure when his next follow up should be  Tasked to nursing

## 2021-02-12 NOTE — TELEPHONE ENCOUNTER
SONIYAI to Dr. Jed Litten---    Pt prefers to see MD in May as he states he is feeling well and reports he will complete CBC next week for MD review. Order is already in place.

## 2021-02-12 NOTE — TELEPHONE ENCOUNTER
Message noted. I did his preop physical around November 2020. He is already on the schedule for February 15 which is Monday. It may be wise to see him Monday just for his post hip replacement visit. I should get a CBC as follow-up as his CBC postop showed just a mild anemia which is expected. However in looking at his records he seems to be up-to-date for most of the things that we would check and therefore if he would like to skip Monday's appointment and make his next appointment 6 months after November which would be May we can just order a CBC to follow-up on his postop anemia and cancel his Monday appointment if he prefers.

## 2021-02-23 ENCOUNTER — LAB ENCOUNTER (OUTPATIENT)
Dept: LAB | Age: 66
End: 2021-02-23
Attending: INTERNAL MEDICINE
Payer: COMMERCIAL

## 2021-02-23 ENCOUNTER — TELEPHONE (OUTPATIENT)
Dept: INTERNAL MEDICINE CLINIC | Facility: CLINIC | Age: 66
End: 2021-02-23

## 2021-02-23 LAB
BASOPHILS # BLD AUTO: 0.03 X10(3) UL (ref 0–0.2)
BASOPHILS NFR BLD AUTO: 0.5 %
DEPRECATED RDW RBC AUTO: 42.2 FL (ref 35.1–46.3)
EOSINOPHIL # BLD AUTO: 0.13 X10(3) UL (ref 0–0.7)
EOSINOPHIL NFR BLD AUTO: 2.1 %
ERYTHROCYTE [DISTWIDTH] IN BLOOD BY AUTOMATED COUNT: 12.9 % (ref 11–15)
HCT VFR BLD AUTO: 42.7 %
HCT VFR BLD CALC: 42.7 %
HGB BLD-MCNC: 14 G/DL
HGB BLD-MCNC: 14 G/DL
IMM GRANULOCYTES # BLD AUTO: 0.04 X10(3) UL (ref 0–1)
IMM GRANULOCYTES NFR BLD: 0.6 %
LYMPHOCYTES # BLD AUTO: 1.45 X10(3) UL (ref 1–4)
LYMPHOCYTES NFR BLD AUTO: 23.5 %
MCH RBC QN AUTO: 29 PG
MCH RBC QN AUTO: 29 PG (ref 26–34)
MCHC RBC AUTO-ENTMCNC: 32.8 G/DL
MCHC RBC AUTO-ENTMCNC: 32.8 G/DL (ref 31–37)
MCV RBC AUTO: 88.6 FL
MCV RBC AUTO: 88.6 FL
MONOCYTES # BLD AUTO: 0.76 X10(3) UL (ref 0.1–1)
MONOCYTES NFR BLD AUTO: 12.3 %
NEUTROPHILS # BLD AUTO: 3.76 X10 (3) UL (ref 1.5–7.7)
NEUTROPHILS # BLD AUTO: 3.76 X10(3) UL (ref 1.5–7.7)
NEUTROPHILS NFR BLD AUTO: 61 %
PLATELET # BLD AUTO: 298 10(3)UL (ref 150–450)
PLATELET # BLD: 298 K/MCL
RBC # BLD AUTO: 4.82 X10(6)UL
RBC # BLD: 4.82 10*6/UL
WBC # BLD AUTO: 6.2 X10(3) UL (ref 4–11)
WBC # BLD: 6.2 K/MCL

## 2021-02-23 PROCEDURE — 36415 COLL VENOUS BLD VENIPUNCTURE: CPT | Performed by: INTERNAL MEDICINE

## 2021-02-23 PROCEDURE — 85025 COMPLETE CBC W/AUTO DIFF WBC: CPT | Performed by: INTERNAL MEDICINE

## 2021-03-12 DIAGNOSIS — Z23 NEED FOR VACCINATION: ICD-10-CM

## 2021-03-18 ENCOUNTER — IMMUNIZATION (OUTPATIENT)
Dept: LAB | Age: 66
End: 2021-03-18
Attending: HOSPITALIST
Payer: COMMERCIAL

## 2021-03-18 DIAGNOSIS — Z23 NEED FOR VACCINATION: Primary | ICD-10-CM

## 2021-03-18 PROCEDURE — 0001A SARSCOV2 VAC 30MCG/0.3ML IM: CPT

## 2021-04-08 ENCOUNTER — IMMUNIZATION (OUTPATIENT)
Dept: LAB | Age: 66
End: 2021-04-08
Attending: HOSPITALIST
Payer: COMMERCIAL

## 2021-04-08 DIAGNOSIS — Z23 NEED FOR VACCINATION: Primary | ICD-10-CM

## 2021-04-08 PROCEDURE — 0002A SARSCOV2 VAC 30MCG/0.3ML IM: CPT

## 2021-05-09 RX ORDER — ATORVASTATIN CALCIUM 20 MG/1
TABLET, FILM COATED ORAL
Qty: 90 TABLET | Refills: 3 | Status: SHIPPED | OUTPATIENT
Start: 2021-05-09

## 2021-05-20 ENCOUNTER — TELEPHONE (OUTPATIENT)
Dept: CARDIOLOGY | Age: 66
End: 2021-05-20

## 2021-05-21 ENCOUNTER — APPOINTMENT (OUTPATIENT)
Dept: CARDIOLOGY | Age: 66
End: 2021-05-21

## 2021-05-25 ENCOUNTER — APPOINTMENT (OUTPATIENT)
Dept: CARDIOLOGY | Age: 66
End: 2021-05-25

## 2021-06-14 ENCOUNTER — OFFICE VISIT (OUTPATIENT)
Dept: OTOLARYNGOLOGY | Facility: CLINIC | Age: 66
End: 2021-06-14
Payer: COMMERCIAL

## 2021-06-14 ENCOUNTER — OFFICE VISIT (OUTPATIENT)
Dept: AUDIOLOGY | Facility: CLINIC | Age: 66
End: 2021-06-14
Payer: COMMERCIAL

## 2021-06-14 VITALS
BODY MASS INDEX: 31.1 KG/M2 | WEIGHT: 210 LBS | DIASTOLIC BLOOD PRESSURE: 90 MMHG | TEMPERATURE: 98 F | SYSTOLIC BLOOD PRESSURE: 158 MMHG | HEIGHT: 69 IN

## 2021-06-14 DIAGNOSIS — H90.41 SENSORINEURAL HEARING LOSS (SNHL) OF RIGHT EAR WITH UNRESTRICTED HEARING OF LEFT EAR: ICD-10-CM

## 2021-06-14 DIAGNOSIS — H90.72 MIXED CONDUCTIVE AND SENSORINEURAL HEARING LOSS OF LEFT EAR WITH UNRESTRICTED HEARING OF RIGHT EAR: ICD-10-CM

## 2021-06-14 DIAGNOSIS — H91.92 HEARING LOSS OF LEFT EAR, UNSPECIFIED HEARING LOSS TYPE: Primary | ICD-10-CM

## 2021-06-14 PROCEDURE — 3077F SYST BP >= 140 MM HG: CPT | Performed by: OTOLARYNGOLOGY

## 2021-06-14 PROCEDURE — 3008F BODY MASS INDEX DOCD: CPT | Performed by: OTOLARYNGOLOGY

## 2021-06-14 PROCEDURE — 92557 COMPREHENSIVE HEARING TEST: CPT | Performed by: AUDIOLOGIST

## 2021-06-14 PROCEDURE — 3080F DIAST BP >= 90 MM HG: CPT | Performed by: OTOLARYNGOLOGY

## 2021-06-14 PROCEDURE — 92567 TYMPANOMETRY: CPT | Performed by: AUDIOLOGIST

## 2021-06-14 PROCEDURE — 99203 OFFICE O/P NEW LOW 30 MIN: CPT | Performed by: OTOLARYNGOLOGY

## 2021-06-15 NOTE — PROGRESS NOTES
Driss Carlos is a 72year old male. Patient presents with:  Ear Problem: Hearing loss of left ear    HPI:   He reports that even as a child he noticed that his hearing was somewhat diminished in his left ear compared to his right.   He feels that that 210 lb (95.3 kg)   BMI 31.01 kg/m²   System Findings Details   Skin Normal Inspection - Normal.   Constitutional Normal Overall appearance - Normal.   Head/Face Normal Facial features - Normal. Eyebrows - Normal. Skull - Normal.   Oral/Oropharynx Normal Sukumar Freeman

## 2021-06-16 PROBLEM — H90.3 SENSORINEURAL HEARING LOSS, BILATERAL: Status: ACTIVE | Noted: 2021-06-16

## 2021-06-16 NOTE — PROGRESS NOTES
AUDIOGRAM     Hazel Cisneros was referred for testing by [facility] for decreased hearing in both ears, left worse than right   7/13/1955  [id number]      Otoscopic inspection: right ear no cerumen; left ear no cerumen.        Tests/Procedures  Pa

## 2021-06-21 ENCOUNTER — TELEPHONE (OUTPATIENT)
Dept: OTOLARYNGOLOGY | Facility: CLINIC | Age: 66
End: 2021-06-21

## 2021-06-22 NOTE — TELEPHONE ENCOUNTER
Informed pt prior auth was approved ,please see referral note ,advised to call insurance for any out of pocket cost,pt stated he received a call from [facility] and already scheduled the MRI.

## 2021-07-01 ENCOUNTER — HOSPITAL ENCOUNTER (OUTPATIENT)
Dept: MRI IMAGING | Age: 66
Discharge: HOME | End: 2021-07-01
Attending: OTOLARYNGOLOGY
Payer: COMMERCIAL

## 2021-07-01 DIAGNOSIS — H91.92 HEARING LOSS OF LEFT EAR, UNSPECIFIED HEARING LOSS TYPE: ICD-10-CM

## 2021-07-01 PROCEDURE — A9575 INJ GADOTERATE MEGLUMI 0.1ML: HCPCS | Performed by: OTOLARYNGOLOGY

## 2021-07-01 PROCEDURE — 70553 MRI BRAIN STEM W/O & W/DYE: CPT | Performed by: OTOLARYNGOLOGY

## 2021-08-30 ENCOUNTER — OFFICE VISIT (OUTPATIENT)
Dept: INTERNAL MEDICINE CLINIC | Facility: CLINIC | Age: 66
End: 2021-08-30
Payer: COMMERCIAL

## 2021-08-30 VITALS
HEIGHT: 69 IN | OXYGEN SATURATION: 98 % | DIASTOLIC BLOOD PRESSURE: 84 MMHG | BODY MASS INDEX: 31.84 KG/M2 | HEART RATE: 90 BPM | TEMPERATURE: 99 F | SYSTOLIC BLOOD PRESSURE: 142 MMHG | WEIGHT: 215 LBS

## 2021-08-30 DIAGNOSIS — I10 ESSENTIAL HYPERTENSION: ICD-10-CM

## 2021-08-30 DIAGNOSIS — Z00.00 ANNUAL PHYSICAL EXAM: Primary | ICD-10-CM

## 2021-08-30 DIAGNOSIS — Z00.00 ROUTINE HEALTH MAINTENANCE: ICD-10-CM

## 2021-08-30 DIAGNOSIS — E78.5 HYPERLIPIDEMIA, UNSPECIFIED HYPERLIPIDEMIA TYPE: ICD-10-CM

## 2021-08-30 DIAGNOSIS — Z12.5 PROSTATE CANCER SCREENING: ICD-10-CM

## 2021-08-30 PROCEDURE — 99397 PER PM REEVAL EST PAT 65+ YR: CPT | Performed by: INTERNAL MEDICINE

## 2021-08-30 PROCEDURE — 3077F SYST BP >= 140 MM HG: CPT | Performed by: INTERNAL MEDICINE

## 2021-08-30 PROCEDURE — 3008F BODY MASS INDEX DOCD: CPT | Performed by: INTERNAL MEDICINE

## 2021-08-30 PROCEDURE — 3079F DIAST BP 80-89 MM HG: CPT | Performed by: INTERNAL MEDICINE

## 2021-08-30 RX ORDER — ASPIRIN 81 MG/1
81 TABLET ORAL DAILY
COMMUNITY

## 2021-08-31 ENCOUNTER — TELEPHONE (OUTPATIENT)
Dept: OTOLARYNGOLOGY | Facility: CLINIC | Age: 66
End: 2021-08-31

## 2021-08-31 NOTE — TELEPHONE ENCOUNTER
Pt drop off kylie form for records to be release to his insurance companie  [facility]. Pt is trying to get Hearing aids Pt said .   Forms faxed to medical records, and send to be scan

## 2021-09-09 ENCOUNTER — LAB ENCOUNTER (OUTPATIENT)
Dept: LAB | Age: 66
End: 2021-09-09
Attending: INTERNAL MEDICINE
Payer: COMMERCIAL

## 2021-09-09 ENCOUNTER — TELEPHONE (OUTPATIENT)
Dept: OTOLARYNGOLOGY | Facility: CLINIC | Age: 66
End: 2021-09-09

## 2021-09-09 DIAGNOSIS — Z12.5 PROSTATE CANCER SCREENING: ICD-10-CM

## 2021-09-09 DIAGNOSIS — Z00.00 ANNUAL PHYSICAL EXAM: ICD-10-CM

## 2021-09-09 LAB
ALBUMIN SERPL-MCNC: 3.9 G/DL (ref 3.4–5)
ALBUMIN/GLOB SERPL: 1.1 {RATIO} (ref 1–2)
ALP LIVER SERPL-CCNC: 55 U/L
ALT SERPL-CCNC: 33 U/L
ANION GAP SERPL CALC-SCNC: 4 MMOL/L (ref 0–18)
AST SERPL-CCNC: 22 U/L (ref 15–37)
BILIRUB SERPL-MCNC: 0.7 MG/DL (ref 0.1–2)
BUN BLD-MCNC: 14 MG/DL (ref 7–18)
BUN/CREAT SERPL: 13.7 (ref 10–20)
CALCIUM BLD-MCNC: 9.3 MG/DL (ref 8.5–10.1)
CHLORIDE SERPL-SCNC: 109 MMOL/L (ref 98–112)
CHOLEST SMN-MCNC: 129 MG/DL (ref ?–200)
CO2 SERPL-SCNC: 28 MMOL/L (ref 21–32)
COMPLEXED PSA SERPL-MCNC: 1.35 NG/ML (ref ?–4)
CREAT BLD-MCNC: 1.02 MG/DL
GLOBULIN PLAS-MCNC: 3.6 G/DL (ref 2.8–4.4)
GLUCOSE BLD-MCNC: 92 MG/DL (ref 70–99)
HDLC SERPL-MCNC: 56 MG/DL (ref 40–59)
LDLC SERPL CALC-MCNC: 58 MG/DL (ref ?–100)
M PROTEIN MFR SERPL ELPH: 7.5 G/DL (ref 6.4–8.2)
NONHDLC SERPL-MCNC: 73 MG/DL (ref ?–130)
OSMOLALITY SERPL CALC.SUM OF ELEC: 292 MOSM/KG (ref 275–295)
PATIENT FASTING Y/N/NP: YES
PATIENT FASTING Y/N/NP: YES
POTASSIUM SERPL-SCNC: 4.5 MMOL/L (ref 3.5–5.1)
SODIUM SERPL-SCNC: 141 MMOL/L (ref 136–145)
TRIGL SERPL-MCNC: 78 MG/DL (ref 30–149)
VLDLC SERPL CALC-MCNC: 11 MG/DL (ref 0–30)

## 2021-09-09 PROCEDURE — 80061 LIPID PANEL: CPT | Performed by: INTERNAL MEDICINE

## 2021-09-09 PROCEDURE — 36415 COLL VENOUS BLD VENIPUNCTURE: CPT | Performed by: INTERNAL MEDICINE

## 2021-09-09 PROCEDURE — 80053 COMPREHEN METABOLIC PANEL: CPT | Performed by: INTERNAL MEDICINE

## 2021-09-09 NOTE — TELEPHONE ENCOUNTER
Per pt asking for copy of HT to get hearing aids, requested it through medical records but has not received anything, can  at office today. Please call thank you.

## 2021-09-10 ENCOUNTER — TELEPHONE (OUTPATIENT)
Dept: INTERNAL MEDICINE CLINIC | Facility: CLINIC | Age: 66
End: 2021-09-10

## 2021-10-29 ENCOUNTER — MED REC SCAN ONLY (OUTPATIENT)
Dept: ADMINISTRATIVE | Age: 66
End: 2021-10-29

## 2021-12-09 ENCOUNTER — TELEPHONE (OUTPATIENT)
Dept: INTERNAL MEDICINE CLINIC | Facility: CLINIC | Age: 66
End: 2021-12-09

## 2021-12-09 DIAGNOSIS — B35.1 NAIL FUNGUS: Primary | ICD-10-CM

## 2021-12-09 NOTE — TELEPHONE ENCOUNTER
Requests podiatrist recommendation, from Dr Sallie Chirinos, for toe nail fungus on his big toe    [phone]

## 2021-12-21 ENCOUNTER — OFFICE VISIT (OUTPATIENT)
Dept: PODIATRY CLINIC | Facility: CLINIC | Age: 66
End: 2021-12-21
Payer: COMMERCIAL

## 2021-12-21 DIAGNOSIS — L60.3 ONYCHODYSTROPHY: Primary | ICD-10-CM

## 2021-12-21 DIAGNOSIS — L60.3 SPLITTING OF NAIL: ICD-10-CM

## 2021-12-21 DIAGNOSIS — L60.1 ONYCHOLYSIS: ICD-10-CM

## 2021-12-21 PROCEDURE — 11755 BIOPSY NAIL UNIT: CPT | Performed by: PODIATRIST

## 2021-12-21 PROCEDURE — 99203 OFFICE O/P NEW LOW 30 MIN: CPT | Performed by: PODIATRIST

## 2021-12-21 NOTE — PROGRESS NOTES
[facility]  Progress Note    Kavya Ceja is a 77year old male.  Patient presents with:  Toenail Fungus: Pt here w/ c/o right hallux fungus, nail fell off couple months ago,         HPI:     This is a pleasant male that presents to clini weekly      Drug use: No    Other Topics      Concerns:        Caffeine Concern: No        Pt has a pacemaker: No        Pt has a defibrillator: No        Reaction to local anesthetic: No          REVIEW OF SYSTEMS:   Denies nausea, fever, chills  No calf monitoring, topical meds, oral meds, nail removal and laser treatment. Advantages and disadvantages of each reviewed.  Using oral agents would require regular   blood test monitoring due to risk of hepatotoxicity and other adverse reactions including drug

## 2021-12-23 ENCOUNTER — TELEPHONE (OUTPATIENT)
Dept: PODIATRY CLINIC | Facility: CLINIC | Age: 66
End: 2021-12-23

## 2021-12-23 RX ORDER — KETOCONAZOLE 20 MG/G
CREAM TOPICAL
Qty: 60 G | Refills: 2 | Status: SHIPPED | OUTPATIENT
Start: 2021-12-23

## 2021-12-23 NOTE — TELEPHONE ENCOUNTER
Called patient and informed him that his nail biopsy did come back positive for fungus. Prescribed ketoconazole cream, pt to apply thin film to affected toenail daily. He is to f/u with me in 3 months to re evaluate.

## 2022-01-07 ENCOUNTER — TELEPHONE (OUTPATIENT)
Dept: INTERNAL MEDICINE CLINIC | Facility: CLINIC | Age: 67
End: 2022-01-07

## 2022-01-07 NOTE — TELEPHONE ENCOUNTER
Please advise - does not have PCR results back , but antigen was positive on 12/31 - to DR. Coco KRISHNAN

## 2022-01-07 NOTE — TELEPHONE ENCOUNTER
Please call pt  He tested positive for COVID on Friday, 12/31/21 with rapid, was probably sick since 12/27/21. Has not rec'd result from PCR yet  What is protocol?    Please call pt to discuss advise  Tasked to nursing

## 2022-01-07 NOTE — TELEPHONE ENCOUNTER
Discussed with patient. He did start having symptoms December 27. He tested positive with an antigen test December 31 but he still waiting for the PCR result. I did discuss current guidelines for quarantine which is 5 days of quarantine and then day 6 through 10 wearing a mask when in public. He does visit his mother who is elderly and frail. Even with those dates he has exceeded the 10-day quarantine now. We elected to wait another 2 days and wait till Monday before he visits his mother. He indicates he will take a rapid test to see if he is negative which would be reassuring before he visits his mother. He knows that he and mother should wear a mask with interactions.     He will call if he has any questions

## 2022-01-28 RX ORDER — ENALAPRIL MALEATE 10 MG/1
TABLET ORAL
Qty: 90 TABLET | Refills: 3 | Status: SHIPPED | OUTPATIENT
Start: 2022-01-28

## 2022-02-24 NOTE — PROGRESS NOTES
Patient ID: Aurora is a 28 year old  who presents today for prenatal visit.  She is of 9w0d gestation.  OB History    Para Term  AB Living   2 1 1 0 0 1   SAB IAB Ectopic Molar Multiple Live Births   0 0 0 0 0 1        Subjective     28 year old  LMP 2021, CHRISSY 2022, EGA 9w0d here to establish PN care. Menses are regular, only breast fed x 5 months after  2021.  Unplanned, spontaneous pregnancy. \"Happy\". No VB. +breast tenderness, fatigue.    She is not vaccinated against Covid or Influenza. She refuses both \"personal choice\".     POBH: 2021 39wk , 3220 girl. IOL for preeclampsia.  x 5 months. \"Naomy\".   PGH: Reg menses. No STI/abnl Pap. LP 2020 neg.  PMH: Obesity, current BMI 39.9. Hx of preeclampsia.   PSH: none.  SH: Long term boyfriend: Real Soriano. 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 Denies tob/EtOH/drugs.  NKDA.  Meds: PNV.  FH: no MR or birth defects. Both Father and Mother with type 2 DM.     PE: OB chart.  TVUS: SLIUP CRL 14.2mm c/w 7w6d.  bpm. +yolk sac and amnion.       Vitals:    22 1223   BP: 129/85   Pulse: 96   Resp: 16   Temp: 98.3 °F (36.8 °C)   TempSrc: Tympanic   Weight: 98.9 kg (218 lb)   Height: 5' 2\" (1.575 m)   PainSc:  0   LMP: 2021       ASSESSMENT:  Pregnancy at 9w0d weeks gestation.    Diagnoses and all orders for this visit:    Pregnancy, unspecified gestational age  -     ABO GROUP/RH /SCREEN OUTPATIENT; Future  -     CBC WITH DIFFERENTIAL; Future  -     CHLAMYDIA/GONORRHEA BY NUCLEIC ACID AMPLIFICATION; Future  -     HIV ANTIGEN/ANTIBODY SCREEN; Future  -     HEPATITIS B SURFACE ANTIGEN; Future  -     URINALYSIS & REFLEX MICROSCOPY; Future  -     URINE, BACTERIAL CULTURE; Future  -     VARICELLA ZOSTER IMMUNITY IGG; Future  -     RPR (RAPID PLASMA REAGIN) TRADITIONAL SYPHILIS SCREEN ALGORITHM; Future  -     RUBELLA ANTIBODY IGG; Future  -     US OB LESS THAN 14 WEEKS FIRST TRIMESTER SINGLE  Kalen Fischer is a 58year old male   HPI:   Pt.presents for the following problems. Patient called January 1 with his blood pressure 172/102 and 186/96. He takes enalapril 10 mg every morning. Dr. Jesus Manuel Alarcon recommended additional enalapril.   Genie GESTATION; Future        1. FWB: S<D. CHRISSY ~10/7/22 by today's sono. Ordered dating sono.   2. PNL: Check initial labs, including early Glucola.  3. Obesity: I counseled pt re: risks of obesity related to pregnancy, including SAb, ONTD, GHTN, GDM, LGA baby, and labor dystocia. I encouraged regular aerobic exercise during pregnancy as well as portion control. TWG goal 15#. (last pregnancy, she gained 43#). Check early Glucola.   4. Hx of preeclampsia: counseled pt re: risk of recurrence. Rec daily baby ASA to start next month.  5. First trimester counseling done: discussed wt gain, nutrition, exercise. HR pregnancy issues above.  She refuses Covid and flu vaccinations. EPDS = 7. 5 P's normal.           PLAN:  Follow up visit in 4 weeks.   History:  10/10, 6/07: COLONOSCOPY  6/16/2016: COLONOSCOPY,BIOPSY N/A      Comment: Procedure: COLONOSCOPY, POSSIBLE BIOPSY,                POSSIBLE POLYPECTOMY 41201;  Surgeon: Juan Francisco Muniz MD;  Location: [address] [place] [address], masses,  stool is OB negative. No rectal masses. MUSCULOSKELETAL:  back is not tender, no joint swelling  EXTREMITIES:  no cyanosis, clubbing or edema. NEURO:  Awake and aware. ASSESSMENT AND PLAN:   1.  Essential hypertension  Blood pressure now b

## 2022-03-14 ENCOUNTER — OFFICE VISIT (OUTPATIENT)
Dept: INTERNAL MEDICINE CLINIC | Facility: CLINIC | Age: 67
End: 2022-03-14
Payer: MEDICARE

## 2022-03-14 VITALS
HEART RATE: 99 BPM | BODY MASS INDEX: 31.1 KG/M2 | HEIGHT: 69 IN | WEIGHT: 210 LBS | OXYGEN SATURATION: 100 % | SYSTOLIC BLOOD PRESSURE: 138 MMHG | TEMPERATURE: 99 F | DIASTOLIC BLOOD PRESSURE: 86 MMHG

## 2022-03-14 DIAGNOSIS — E78.5 HYPERLIPIDEMIA, UNSPECIFIED HYPERLIPIDEMIA TYPE: ICD-10-CM

## 2022-03-14 DIAGNOSIS — Z86.010 HX OF ADENOMATOUS COLONIC POLYPS: ICD-10-CM

## 2022-03-14 DIAGNOSIS — Z00.00 ROUTINE HEALTH MAINTENANCE: ICD-10-CM

## 2022-03-14 DIAGNOSIS — I10 ESSENTIAL HYPERTENSION: Primary | ICD-10-CM

## 2022-03-14 PROCEDURE — 99214 OFFICE O/P EST MOD 30 MIN: CPT | Performed by: INTERNAL MEDICINE

## 2022-03-14 PROCEDURE — 3075F SYST BP GE 130 - 139MM HG: CPT | Performed by: INTERNAL MEDICINE

## 2022-03-14 PROCEDURE — 3008F BODY MASS INDEX DOCD: CPT | Performed by: INTERNAL MEDICINE

## 2022-03-14 PROCEDURE — 3079F DIAST BP 80-89 MM HG: CPT | Performed by: INTERNAL MEDICINE

## 2022-03-18 ENCOUNTER — TELEPHONE (OUTPATIENT)
Dept: INTERNAL MEDICINE CLINIC | Facility: CLINIC | Age: 67
End: 2022-03-18

## 2022-03-18 NOTE — TELEPHONE ENCOUNTER
Please call pt  He said [facility] or [facility], both in network   Please call to advise  Tasked to nursing

## 2022-03-18 NOTE — TELEPHONE ENCOUNTER
Please make follow-up call sometime today or Monday. Patient was good at find out where the best place for him to get his labs yet. Either with [facility] or with [facility] [employer] [facility].  Thank you.

## 2022-03-26 LAB
ABSOLUTE EOSINOPHILS: 182 CELLS/UL (ref 15–500)
ABSOLUTE MONOCYTES: 715 CELLS/UL (ref 200–950)
ABSOLUTE NEUTROPHILS: 3439 CELLS/UL (ref 1500–7800)
APPEARANCE: CLEAR
BASOPHILS: 0.9 %
BILIRUBIN: NEGATIVE
BUN: 17 MG/DL (ref 7–25)
CALCIUM: 9.2 MG/DL (ref 8.6–10.3)
CARBON DIOXIDE: 27 MMOL/L (ref 20–32)
CHLORIDE: 105 MMOL/L (ref 98–110)
CHOL/HDLC RATIO: 2.7 (CALC)
CHOLESTEROL, TOTAL: 139 MG/DL
COLOR: YELLOW
CREATININE: 0.94 MG/DL (ref 0.7–1.25)
EGFR IF AFRICN AM: 98 ML/MIN/1.73M2
EOSINOPHILS: 2.8 %
GLUCOSE: 87 MG/DL (ref 65–99)
GLUCOSE: NEGATIVE
HDL CHOLESTEROL: 52 MG/DL
HEMATOCRIT: 42.2 % (ref 38.5–50)
HEMOGLOBIN: 13.7 G/DL (ref 13.2–17.1)
KETONES: NEGATIVE
LDL-CHOLESTEROL: 72 MG/DL (CALC)
LEUKOCYTE ESTERASE: NEGATIVE
LYMPHOCYTES: 32.4 %
MCH: 28.7 PG (ref 27–33)
MCHC: 32.5 G/DL (ref 32–36)
MCV: 88.5 FL (ref 80–100)
MONOCYTES: 11 %
MPV: 9.4 FL (ref 7.5–12.5)
NEUTROPHILS: 52.9 %
NITRITE: NEGATIVE
NON-HDL CHOLESTEROL: 87 MG/DL (CALC)
OCCULT BLOOD: NEGATIVE
PLATELET COUNT: 282 THOUSAND/UL (ref 140–400)
POTASSIUM: 4.5 MMOL/L (ref 3.5–5.3)
PROTEIN: NEGATIVE
RDW: 13 % (ref 11–15)
RED BLOOD CELL COUNT: 4.77 MILLION/UL (ref 4.2–5.8)
SODIUM: 138 MMOL/L (ref 135–146)
SPECIFIC GRAVITY: 1.02 (ref 1–1.03)
TRIGLYCERIDES: 74 MG/DL
WHITE BLOOD CELL COUNT: 6.5 THOUSAND/UL (ref 3.8–10.8)

## 2022-03-27 ENCOUNTER — TELEPHONE (OUTPATIENT)
Dept: INTERNAL MEDICINE CLINIC | Facility: CLINIC | Age: 67
End: 2022-03-27

## 2022-03-27 NOTE — TELEPHONE ENCOUNTER
Please let patient know that I thought his labs and urinalysis came out very good. Cholesterol in good range. I am very satisfied with his results.

## 2022-05-13 RX ORDER — ATORVASTATIN CALCIUM 20 MG/1
TABLET, FILM COATED ORAL
Qty: 90 TABLET | Refills: 3 | Status: SHIPPED | OUTPATIENT
Start: 2022-05-13

## 2022-05-23 ENCOUNTER — TELEPHONE (OUTPATIENT)
Dept: INTERNAL MEDICINE CLINIC | Facility: CLINIC | Age: 67
End: 2022-05-23

## 2022-05-23 NOTE — TELEPHONE ENCOUNTER
Patient is calling to speak with a nurse. Patient is wondering what blood type he is.       # [phone]

## 2022-05-23 NOTE — TELEPHONE ENCOUNTER
Called patient and explained that type and screen from November 2019 showed A positive - verbalized understanding

## 2022-10-25 ENCOUNTER — OFFICE VISIT (OUTPATIENT)
Dept: INTERNAL MEDICINE CLINIC | Facility: CLINIC | Age: 67
End: 2022-10-25
Payer: MEDICARE

## 2022-10-25 VITALS
SYSTOLIC BLOOD PRESSURE: 124 MMHG | TEMPERATURE: 98 F | BODY MASS INDEX: 31.96 KG/M2 | WEIGHT: 215.81 LBS | HEIGHT: 69 IN | HEART RATE: 90 BPM | OXYGEN SATURATION: 97 % | DIASTOLIC BLOOD PRESSURE: 84 MMHG

## 2022-10-25 DIAGNOSIS — Z23 NEED FOR VACCINATION AGAINST STREPTOCOCCUS PNEUMONIAE: ICD-10-CM

## 2022-10-25 DIAGNOSIS — Z86.010 HX OF ADENOMATOUS COLONIC POLYPS: ICD-10-CM

## 2022-10-25 DIAGNOSIS — I10 ESSENTIAL HYPERTENSION: Primary | ICD-10-CM

## 2022-10-25 DIAGNOSIS — E78.5 HYPERLIPIDEMIA, UNSPECIFIED HYPERLIPIDEMIA TYPE: ICD-10-CM

## 2022-10-25 DIAGNOSIS — Z12.5 PROSTATE CANCER SCREENING: ICD-10-CM

## 2022-10-25 DIAGNOSIS — Z00.00 ROUTINE HEALTH MAINTENANCE: ICD-10-CM

## 2022-10-25 PROCEDURE — 3079F DIAST BP 80-89 MM HG: CPT | Performed by: INTERNAL MEDICINE

## 2022-10-25 PROCEDURE — 1126F AMNT PAIN NOTED NONE PRSNT: CPT | Performed by: INTERNAL MEDICINE

## 2022-10-25 PROCEDURE — 3008F BODY MASS INDEX DOCD: CPT | Performed by: INTERNAL MEDICINE

## 2022-10-25 PROCEDURE — 99214 OFFICE O/P EST MOD 30 MIN: CPT | Performed by: INTERNAL MEDICINE

## 2022-10-25 PROCEDURE — 3074F SYST BP LT 130 MM HG: CPT | Performed by: INTERNAL MEDICINE

## 2022-12-01 ENCOUNTER — TELEPHONE (OUTPATIENT)
Dept: INTERNAL MEDICINE CLINIC | Facility: CLINIC | Age: 67
End: 2022-12-01

## 2022-12-01 DIAGNOSIS — E78.5 HYPERLIPIDEMIA, UNSPECIFIED HYPERLIPIDEMIA TYPE: Primary | ICD-10-CM

## 2022-12-01 DIAGNOSIS — Z12.5 PROSTATE CANCER SCREENING: ICD-10-CM

## 2022-12-01 DIAGNOSIS — I10 ESSENTIAL HYPERTENSION: ICD-10-CM

## 2022-12-01 NOTE — TELEPHONE ENCOUNTER
Patient is calling there is an order for lab work in the system it needs to be changed to [facility]    Patient has an appointment today at 1:50 pm    Please call patient when complete [phone]

## 2022-12-03 ENCOUNTER — TELEPHONE (OUTPATIENT)
Dept: INTERNAL MEDICINE CLINIC | Facility: CLINIC | Age: 67
End: 2022-12-03

## 2022-12-03 LAB
ALBUMIN/GLOBULIN RATIO: 1.6 (CALC) (ref 1–2.5)
ALBUMIN: 4.5 G/DL (ref 3.6–5.1)
ALKALINE PHOSPHATASE: 58 U/L (ref 35–144)
ALT: 20 U/L (ref 9–46)
AST: 21 U/L (ref 10–35)
BILIRUBIN, TOTAL: 0.5 MG/DL (ref 0.2–1.2)
BUN: 16 MG/DL (ref 7–25)
CALCIUM: 9.5 MG/DL (ref 8.6–10.3)
CARBON DIOXIDE: 26 MMOL/L (ref 20–32)
CHLORIDE: 104 MMOL/L (ref 98–110)
CHOL/HDLC RATIO: 2.9 (CALC)
CHOLESTEROL, TOTAL: 150 MG/DL
CREATININE: 0.97 MG/DL (ref 0.7–1.35)
EGFR: 86 ML/MIN/1.73M2
GLOBULIN: 2.9 G/DL (CALC) (ref 1.9–3.7)
GLUCOSE: 86 MG/DL (ref 65–99)
HDL CHOLESTEROL: 52 MG/DL
LDL-CHOLESTEROL: 83 MG/DL (CALC)
NON-HDL CHOLESTEROL: 98 MG/DL (CALC)
POTASSIUM: 4.9 MMOL/L (ref 3.5–5.3)
PROTEIN, TOTAL: 7.4 G/DL (ref 6.1–8.1)
PSA, TOTAL: 1.28 NG/ML
SODIUM: 138 MMOL/L (ref 135–146)
TRIGLYCERIDES: 73 MG/DL

## 2022-12-03 NOTE — TELEPHONE ENCOUNTER
Please let patient know that I thought his labs look good. PSA good. Cholesterol good. Chemistry is good.

## 2022-12-08 RX ORDER — ENALAPRIL MALEATE 10 MG/1
TABLET ORAL
Qty: 90 TABLET | Refills: 3 | Status: SHIPPED | OUTPATIENT
Start: 2022-12-08

## 2023-06-08 RX ORDER — ATORVASTATIN CALCIUM 20 MG/1
TABLET, FILM COATED ORAL
Qty: 90 TABLET | Refills: 3 | Status: SHIPPED | OUTPATIENT
Start: 2023-06-08

## 2023-08-14 ENCOUNTER — EKG ENCOUNTER (OUTPATIENT)
Dept: LAB | Age: 68
End: 2023-08-14
Attending: INTERNAL MEDICINE
Payer: MEDICARE

## 2023-08-14 ENCOUNTER — OFFICE VISIT (OUTPATIENT)
Dept: INTERNAL MEDICINE CLINIC | Facility: CLINIC | Age: 68
End: 2023-08-14

## 2023-08-14 VITALS
BODY MASS INDEX: 31.99 KG/M2 | OXYGEN SATURATION: 97 % | HEART RATE: 104 BPM | DIASTOLIC BLOOD PRESSURE: 80 MMHG | HEIGHT: 69 IN | SYSTOLIC BLOOD PRESSURE: 140 MMHG | WEIGHT: 216 LBS

## 2023-08-14 DIAGNOSIS — Z86.010 HX OF ADENOMATOUS COLONIC POLYPS: ICD-10-CM

## 2023-08-14 DIAGNOSIS — Z00.00 ROUTINE HEALTH MAINTENANCE: ICD-10-CM

## 2023-08-14 DIAGNOSIS — Z00.00 MEDICARE ANNUAL WELLNESS VISIT, SUBSEQUENT: Primary | ICD-10-CM

## 2023-08-14 DIAGNOSIS — E78.5 HYPERLIPIDEMIA, UNSPECIFIED HYPERLIPIDEMIA TYPE: ICD-10-CM

## 2023-08-14 DIAGNOSIS — I10 ESSENTIAL HYPERTENSION: ICD-10-CM

## 2023-08-14 PROBLEM — R10.32 LEFT GROIN PAIN: Status: RESOLVED | Noted: 2017-12-11 | Resolved: 2023-08-14

## 2023-08-14 PROBLEM — H90.3 SENSORINEURAL HEARING LOSS, BILATERAL: Status: RESOLVED | Noted: 2021-06-16 | Resolved: 2023-08-14

## 2023-08-14 PROBLEM — L71.9 ROSACEA: Status: RESOLVED | Noted: 2018-02-05 | Resolved: 2023-08-14

## 2023-08-14 PROBLEM — M16.11 PRIMARY OSTEOARTHRITIS OF RIGHT HIP: Status: RESOLVED | Noted: 2020-12-01 | Resolved: 2023-08-14

## 2023-08-14 PROBLEM — M16.12 OSTEOARTHRITIS OF LEFT HIP: Status: RESOLVED | Noted: 2019-04-30 | Resolved: 2023-08-14

## 2023-08-14 LAB
ATRIAL RATE: 89 BPM
P AXIS: 71 DEGREES
P-R INTERVAL: 184 MS
Q-T INTERVAL: 376 MS
QRS DURATION: 94 MS
QTC CALCULATION (BEZET): 457 MS
R AXIS: 54 DEGREES
T AXIS: 56 DEGREES
VENTRICULAR RATE: 89 BPM

## 2023-08-14 PROCEDURE — 3077F SYST BP >= 140 MM HG: CPT | Performed by: INTERNAL MEDICINE

## 2023-08-14 PROCEDURE — 93010 ELECTROCARDIOGRAM REPORT: CPT | Performed by: INTERNAL MEDICINE

## 2023-08-14 PROCEDURE — 3008F BODY MASS INDEX DOCD: CPT | Performed by: INTERNAL MEDICINE

## 2023-08-14 PROCEDURE — 1126F AMNT PAIN NOTED NONE PRSNT: CPT | Performed by: INTERNAL MEDICINE

## 2023-08-14 PROCEDURE — 3079F DIAST BP 80-89 MM HG: CPT | Performed by: INTERNAL MEDICINE

## 2023-08-14 PROCEDURE — G0438 PPPS, INITIAL VISIT: HCPCS | Performed by: INTERNAL MEDICINE

## 2023-08-14 PROCEDURE — 99214 OFFICE O/P EST MOD 30 MIN: CPT | Performed by: INTERNAL MEDICINE

## 2023-08-14 PROCEDURE — 93005 ELECTROCARDIOGRAM TRACING: CPT

## 2023-08-14 PROCEDURE — 96160 PT-FOCUSED HLTH RISK ASSMT: CPT | Performed by: INTERNAL MEDICINE

## 2023-08-14 PROCEDURE — 1170F FXNL STATUS ASSESSED: CPT | Performed by: INTERNAL MEDICINE

## 2023-08-15 ENCOUNTER — TELEPHONE (OUTPATIENT)
Dept: INTERNAL MEDICINE CLINIC | Facility: CLINIC | Age: 68
End: 2023-08-15

## 2023-08-15 NOTE — TELEPHONE ENCOUNTER
Please let patient know that I thought his EKG looks good. There was mention of \"incomplete right bundle branch block\". This is a minor variation and we typically do not need to follow-up on this. Also there was mention of \"possible left atrial enlargement\". Again I think we do not need to follow-up with that. All in all I looked and compared his EKG to an EKG November 6, 2020 and April 2, 2019 and I see no major changes. All in all I am very satisfied with his EKG results.

## 2023-09-16 LAB
ABSOLUTE BASOPHILS: 50 CELLS/UL (ref 0–200)
ABSOLUTE EOSINOPHILS: 139 CELLS/UL (ref 15–500)
ABSOLUTE LYMPHOCYTES: 1884 CELLS/UL (ref 850–3900)
ABSOLUTE MONOCYTES: 599 CELLS/UL (ref 200–950)
ABSOLUTE NEUTROPHILS: 3629 CELLS/UL (ref 1500–7800)
APPEARANCE: CLEAR
BASOPHILS: 0.8 %
BILIRUBIN: NEGATIVE
BUN: 14 MG/DL (ref 7–25)
CALCIUM: 9.1 MG/DL (ref 8.6–10.3)
CARBON DIOXIDE: 28 MMOL/L (ref 20–32)
CHLORIDE: 104 MMOL/L (ref 98–110)
CHOL/HDLC RATIO: 2.7 (CALC)
CHOLESTEROL, TOTAL: 140 MG/DL
COLOR: YELLOW
CREATININE: 1.01 MG/DL (ref 0.7–1.35)
EGFR: 81 ML/MIN/1.73M2
EOSINOPHILS: 2.2 %
GLUCOSE: 91 MG/DL (ref 65–99)
GLUCOSE: NEGATIVE
HDL CHOLESTEROL: 52 MG/DL
HEMATOCRIT: 39.2 % (ref 38.5–50)
HEMOGLOBIN: 12.8 G/DL (ref 13.2–17.1)
KETONES: NEGATIVE
LDL-CHOLESTEROL: 72 MG/DL (CALC)
LEUKOCYTE ESTERASE: NEGATIVE
LYMPHOCYTES: 29.9 %
MCH: 28.8 PG (ref 27–33)
MCHC: 32.7 G/DL (ref 32–36)
MCV: 88.3 FL (ref 80–100)
MONOCYTES: 9.5 %
MPV: 9.6 FL (ref 7.5–12.5)
NEUTROPHILS: 57.6 %
NITRITE: NEGATIVE
NON-HDL CHOLESTEROL: 88 MG/DL (CALC)
OCCULT BLOOD: NEGATIVE
PH: 5.5 (ref 5–8)
PLATELET COUNT: 277 THOUSAND/UL (ref 140–400)
POTASSIUM: 4.6 MMOL/L (ref 3.5–5.3)
PROTEIN: NEGATIVE
RDW: 13.1 % (ref 11–15)
RED BLOOD CELL COUNT: 4.44 MILLION/UL (ref 4.2–5.8)
SODIUM: 140 MMOL/L (ref 135–146)
SPECIFIC GRAVITY: 1.01 (ref 1–1.03)
TRIGLYCERIDES: 78 MG/DL
WHITE BLOOD CELL COUNT: 6.3 THOUSAND/UL (ref 3.8–10.8)

## 2023-09-19 ENCOUNTER — TELEPHONE (OUTPATIENT)
Dept: INTERNAL MEDICINE CLINIC | Facility: CLINIC | Age: 68
End: 2023-09-19

## 2023-09-19 DIAGNOSIS — D64.9 ANEMIA, UNSPECIFIED TYPE: Primary | ICD-10-CM

## 2023-09-19 NOTE — TELEPHONE ENCOUNTER
Please call patient and let him know that his electrolytes and cholesterol were in a good range. Urinalysis was good. He does have a very mild anemia. I am not quite sure why. It does not look very severe but I would like to check his iron and vitamin B12 levels. I placed order. We can recheck his blood count. He does not have to fast.    He can do this within the next 2 weeks or so. I placed the orders for the hospital (not [facility]).   I think he gets labs through the hospital.

## 2023-09-19 NOTE — TELEPHONE ENCOUNTER
Spoke with patient to relay MD message below; Patient verbalized understanding. Pt did not have any questions or concerns at this time. He plans to repeat labs at [facility]. Lab orders converted.  OK with MD.

## 2023-10-13 ENCOUNTER — TELEPHONE (OUTPATIENT)
Dept: INTERNAL MEDICINE CLINIC | Facility: CLINIC | Age: 68
End: 2023-10-13

## 2023-10-13 LAB
% SATURATION: 43 % (CALC) (ref 20–48)
ABSOLUTE BASOPHILS: 38 CELLS/UL (ref 0–200)
ABSOLUTE EOSINOPHILS: 139 CELLS/UL (ref 15–500)
ABSOLUTE LYMPHOCYTES: 1739 CELLS/UL (ref 850–3900)
ABSOLUTE MONOCYTES: 674 CELLS/UL (ref 200–950)
ABSOLUTE NEUTROPHILS: 3711 CELLS/UL (ref 1500–7800)
BASOPHILS: 0.6 %
EOSINOPHILS: 2.2 %
FERRITIN: 99 NG/ML (ref 24–380)
HEMATOCRIT: 41.4 % (ref 38.5–50)
HEMOGLOBIN: 13.7 G/DL (ref 13.2–17.1)
IRON BINDING CAPACITY: 346 MCG/DL (CALC) (ref 250–425)
IRON, TOTAL: 148 MCG/DL (ref 50–180)
LYMPHOCYTES: 27.6 %
MCH: 29.7 PG (ref 27–33)
MCHC: 33.1 G/DL (ref 32–36)
MCV: 89.6 FL (ref 80–100)
MONOCYTES: 10.7 %
MPV: 9.4 FL (ref 7.5–12.5)
NEUTROPHILS: 58.9 %
PLATELET COUNT: 292 THOUSAND/UL (ref 140–400)
RDW: 13 % (ref 11–15)
RED BLOOD CELL COUNT: 4.62 MILLION/UL (ref 4.2–5.8)
TSH W/REFLEX TO FT4: 1.2 MIU/L (ref 0.4–4.5)
VITAMIN B12: 327 PG/ML (ref 200–1100)
WHITE BLOOD CELL COUNT: 6.3 THOUSAND/UL (ref 3.8–10.8)

## 2023-10-13 NOTE — TELEPHONE ENCOUNTER
Please let patient know that I thought his test results came out good. Anemia gone. Iron studies good. Vitamin B12 level good. I am very satisfied with his results. For now we can just follow-up in 6 months.

## 2023-11-02 RX ORDER — KETOCONAZOLE 20 MG/G
CREAM TOPICAL
Qty: 60 G | Refills: 2 | OUTPATIENT
Start: 2023-11-02

## 2023-11-02 NOTE — TELEPHONE ENCOUNTER
Rx request for Ketoconazole, please see message below. Thank you.    Last seen: 12/21/21  Last refill: 12/23/21 # 60g with 2 refills

## 2024-04-03 RX ORDER — ENALAPRIL MALEATE 10 MG/1
TABLET ORAL
Qty: 90 TABLET | Refills: 3 | Status: SHIPPED | OUTPATIENT
Start: 2024-04-03

## 2024-04-03 NOTE — TELEPHONE ENCOUNTER
Refill request is for a maintenance medication and has met the criteria specified in the Ambulatory Medication Refill Standing Order for eligibility, visits, laboratory, alerts and was sent to the requested pharmacy.    Requested Prescriptions     Signed Prescriptions Disp Refills    ENALAPRIL 10 MG Oral Tab 90 tablet 3     Sig: TAKE 1 TABLET BY MOUTH EVERY DAY IN THE MORNING     Authorizing Provider: ANA ROSA GIBSON     Ordering User: SHEREEN LEON

## 2024-05-31 ENCOUNTER — OFFICE VISIT (OUTPATIENT)
Dept: INTERNAL MEDICINE CLINIC | Facility: CLINIC | Age: 69
End: 2024-05-31

## 2024-05-31 VITALS
SYSTOLIC BLOOD PRESSURE: 120 MMHG | DIASTOLIC BLOOD PRESSURE: 70 MMHG | HEART RATE: 105 BPM | WEIGHT: 213.38 LBS | TEMPERATURE: 99 F | BODY MASS INDEX: 31.6 KG/M2 | HEIGHT: 69 IN | OXYGEN SATURATION: 96 %

## 2024-05-31 DIAGNOSIS — Z86.010 HX OF ADENOMATOUS COLONIC POLYPS: ICD-10-CM

## 2024-05-31 DIAGNOSIS — I10 ESSENTIAL HYPERTENSION: Primary | ICD-10-CM

## 2024-05-31 DIAGNOSIS — E78.5 HYPERLIPIDEMIA, UNSPECIFIED HYPERLIPIDEMIA TYPE: ICD-10-CM

## 2024-05-31 DIAGNOSIS — Z12.5 PROSTATE CANCER SCREENING: ICD-10-CM

## 2024-05-31 DIAGNOSIS — Z00.00 ROUTINE HEALTH MAINTENANCE: ICD-10-CM

## 2024-05-31 PROCEDURE — 99214 OFFICE O/P EST MOD 30 MIN: CPT | Performed by: INTERNAL MEDICINE

## 2024-05-31 NOTE — PROGRESS NOTES
Rafat Thurman is a 68 year old male.  HPI:     Chief Complaint   Patient presents with    Checkup     6 month check up      Rafat presents for 6-month checkup.    He is doing well.  He has no cardiac complaints.  He no longer needs to see Dr. Jacobson from cardiology.  He has known to have nonobstructive coronary disease.    He has hypertension.  Blood pressure under good control    I did remind him that Dr. Barrera wanted to get your colonoscopy follow-up.  August 2022 he had 9 polyps.  2-year follow-up was recommended.  I asked him to call sooner rather than later to get his colonoscopy scheduled for August as sometimes it takes a few months to schedule colonoscopy.  His pathology report from August 2022 showed cecal polyp showing a tubular adenoma.  Ascending colonic polyps showed multiple portions of tubular adenoma and portions of sessile serrated polyp.  Transverse colonic polyps show 2 portions of tubular adenomas and portions of sessile serrated polyp.  In the descending colon were 2 portions of tubular adenomas.    We talked about the availability of PCV 20 pneumonia vaccine.  He would wish to hold off for now.  I did give him the vaccine literature.    He has had Shingrix x 2.  Current Outpatient Medications   Medication Sig Dispense Refill    ENALAPRIL 10 MG Oral Tab TAKE 1 TABLET BY MOUTH EVERY DAY IN THE MORNING 90 tablet 3    ATORVASTATIN 20 MG Oral Tab TAKE 1 TABLET BY MOUTH EVERY DAY AT NIGHT 90 tablet 3    aspirin 81 MG Oral Tab EC Take 1 tablet (81 mg total) by mouth daily.      TURMERIC OR Take by mouth daily.        ketoconazole 2 % External Cream Apply thin film to affected toenail daily (Patient not taking: Reported on 5/31/2024) 60 g 2      Past Medical History:    Adenomatous colon polyp    High blood pressure    High cholesterol    Left groin pain    Nonspecific abnormal findings on radiological and examination of intrathoracic organs    Osteoarthritis    Osteoarthritis of left hip     Primary osteoarthritis of right hip    Rosacea    Sensorineural hearing loss, bilateral    Visual impairment    contacts      Social History:  Social History     Socioeconomic History    Marital status:    Tobacco Use    Smoking status: Never    Smokeless tobacco: Never   Vaping Use    Vaping status: Never Used   Substance and Sexual Activity    Alcohol use: Yes     Comment: 2-3 drinks weekly    Drug use: No   Other Topics Concern    Caffeine Concern No    Pt has a pacemaker No    Pt has a defibrillator No    Reaction to local anesthetic No        REVIEW OF SYSTEMS:   GENERAL HEALTH:  feels well otherwise  RESPIRATORY:  Voices no shortness of breath with exertion or cough  CARDIOVASCULAR:  Voices no chest pain on exertion or shortness of breath  GI:   Voices no abdominal pain or changes of bowels   :Viices no urning or frequency of urination.  NEURO:  Voices no  headaches or dizziness    EXAM:   /70 (BP Location: Right arm, Patient Position: Sitting, Cuff Size: adult)   Pulse 105   Temp 98.6 °F (37 °C) (Oral)   Ht 5' 9\" (1.753 m)   Wt 213 lb 6.4 oz (96.8 kg)   SpO2 96%   BMI 31.51 kg/m²     GENERAL:  well developed, well nourished, in no apparent distress  SKIN:  no rashes , no suspicious lesions  HEENT: atraumatic.   Pharynx normal without exudate.  EYES:  PERRL. Sclera anicteric.  NECK:  Supple,  no adenopathy,  thyroid normal  LUNGS:  clear to auscultation.  Effort normal  CARDIO:  RRR without murmur.   S1 and S2 normal  GI:  good BS's,  no masses,   HSM or tenderness  EXTREMITIES : no cyanosis, clubbing or edema    ASSESSMENT AND PLAN:     1. Essential hypertension  Blood pressure under good control.  - CBC With Differential With Platelet  - Comp Metabolic Panel (14)  - Lipid Panel    2. Hyperlipidemia, unspecified hyperlipidemia type  Update lipids.  On statin.  - CBC With Differential With Platelet  - Comp Metabolic Panel (14)  - Lipid Panel    3. Routine health maintenance  We talked  about vaccines as above.    4. Hx of adenomatous colonic polyps  2-year follow-up colonoscopy due this August.    5. Prostate cancer screening  Screening PSA.  - PSA, TOTAL [5363] [Q]    This visit was 30 minutes.  I spent 10 minutes before visit preparing and reviewing old records.  Greater than 50% of the visit was engaged in counseling and review of past data.    Follow-up in 6 months.    The patient indicates understanding of these issues and agrees to the plan.    Carlos Ramon MD  5/31/2024  11:54 AM

## 2024-06-26 LAB
ABSOLUTE BASOPHILS: 60 CELLS/UL (ref 0–200)
ABSOLUTE EOSINOPHILS: 201 CELLS/UL (ref 15–500)
ABSOLUTE LYMPHOCYTES: 1936 CELLS/UL (ref 850–3900)
ABSOLUTE MONOCYTES: 623 CELLS/UL (ref 200–950)
ABSOLUTE NEUTROPHILS: 3879 CELLS/UL (ref 1500–7800)
ALBUMIN/GLOBULIN RATIO: 1.6 (CALC) (ref 1–2.5)
ALBUMIN: 4.5 G/DL (ref 3.6–5.1)
ALKALINE PHOSPHATASE: 57 U/L (ref 35–144)
ALT: 16 U/L (ref 9–46)
AST: 18 U/L (ref 10–35)
BASOPHILS: 0.9 %
BILIRUBIN, TOTAL: 0.7 MG/DL (ref 0.2–1.2)
BUN: 14 MG/DL (ref 7–25)
CALCIUM: 9.3 MG/DL (ref 8.6–10.3)
CARBON DIOXIDE: 23 MMOL/L (ref 20–32)
CHLORIDE: 105 MMOL/L (ref 98–110)
CHOL/HDLC RATIO: 2.7 (CALC)
CHOLESTEROL, TOTAL: 152 MG/DL
CREATININE: 0.99 MG/DL (ref 0.7–1.35)
EGFR: 83 ML/MIN/1.73M2
EOSINOPHILS: 3 %
GLOBULIN: 2.9 G/DL (CALC) (ref 1.9–3.7)
GLUCOSE: 95 MG/DL (ref 65–99)
HDL CHOLESTEROL: 56 MG/DL
HEMATOCRIT: 41.7 % (ref 38.5–50)
HEMOGLOBIN: 13.4 G/DL (ref 13.2–17.1)
LDL-CHOLESTEROL: 79 MG/DL (CALC)
LYMPHOCYTES: 28.9 %
MCH: 28.9 PG (ref 27–33)
MCHC: 32.1 G/DL (ref 32–36)
MCV: 90.1 FL (ref 80–100)
MONOCYTES: 9.3 %
MPV: 9.6 FL (ref 7.5–12.5)
NEUTROPHILS: 57.9 %
NON-HDL CHOLESTEROL: 96 MG/DL (CALC)
PLATELET COUNT: 321 THOUSAND/UL (ref 140–400)
POTASSIUM: 4.7 MMOL/L (ref 3.5–5.3)
PROTEIN, TOTAL: 7.4 G/DL (ref 6.1–8.1)
PSA, TOTAL: 2.89 NG/ML
RDW: 13.1 % (ref 11–15)
RED BLOOD CELL COUNT: 4.63 MILLION/UL (ref 4.2–5.8)
SODIUM: 139 MMOL/L (ref 135–146)
TRIGLYCERIDES: 90 MG/DL
WHITE BLOOD CELL COUNT: 6.7 THOUSAND/UL (ref 3.8–10.8)

## 2024-07-05 ENCOUNTER — TELEPHONE (OUTPATIENT)
Dept: INTERNAL MEDICINE CLINIC | Facility: CLINIC | Age: 69
End: 2024-07-05

## 2024-07-05 DIAGNOSIS — R97.20 RISING PSA LEVEL: Primary | ICD-10-CM

## 2024-07-05 NOTE — TELEPHONE ENCOUNTER
Called patient and relayed  message - verbalized understanding. Information for  DR. Goodwin given to patient

## 2024-07-05 NOTE — TELEPHONE ENCOUNTER
Please call patient.  Please let him know that I thought the labs came out fairly satisfactory except for his PSA.    I looked back on prior  PSAs and his current PSA doubled in value however it still below the 4.0 jorge where we start to get concerned about prostate cancer.  However there is no PSA that guarantees that we do not have prostate cancer.  PSAs can fluctuate up and down so I am not too concerned about this.    However , I thought it would be matta for him to at least see urology for an opinion.  I placed referral to Dr. Goodwin.  This way he can give advice as to what he thinks why the PSA increased.    Again, I do not think he needs anything major to worry about his PSAs in men can fluctuate up and down but I do want him to see urology for at least an opinion.

## 2024-07-08 RX ORDER — ATORVASTATIN CALCIUM 20 MG/1
TABLET, FILM COATED ORAL
Qty: 90 TABLET | Refills: 3 | Status: SHIPPED | OUTPATIENT
Start: 2024-07-08

## 2024-07-08 NOTE — TELEPHONE ENCOUNTER
Refill request is for a maintenance medication and has met the criteria specified in the Ambulatory Medication Refill Standing Order for eligibility, visits, laboratory, alerts and was sent to the requested pharmacy.    Requested Prescriptions     Signed Prescriptions Disp Refills    ATORVASTATIN 20 MG Oral Tab 90 tablet 3     Sig: TAKE 1 TABLET BY MOUTH EVERY DAY AT NIGHT     Authorizing Provider: ANA ROSA GIBSON     Ordering User: LUIS GROVES

## 2024-09-05 ENCOUNTER — OFFICE VISIT (OUTPATIENT)
Dept: SURGERY | Facility: CLINIC | Age: 69
End: 2024-09-05
Payer: MEDICARE

## 2024-09-05 VITALS — DIASTOLIC BLOOD PRESSURE: 91 MMHG | HEART RATE: 105 BPM | SYSTOLIC BLOOD PRESSURE: 166 MMHG

## 2024-09-05 DIAGNOSIS — R82.90 URINE FINDING: ICD-10-CM

## 2024-09-05 DIAGNOSIS — Z12.5 PROSTATE CANCER SCREENING: ICD-10-CM

## 2024-09-05 DIAGNOSIS — R97.20 INCREASED PROSTATE SPECIFIC ANTIGEN (PSA) VELOCITY: Primary | ICD-10-CM

## 2024-09-05 LAB
BILIRUBIN: NEGATIVE
GLUCOSE (URINE DIPSTICK): NEGATIVE MG/DL
KETONES (URINE DIPSTICK): NEGATIVE MG/DL
LEUKOCYTES: NEGATIVE
MULTISTIX LOT#: NORMAL NUMERIC
NITRITE, URINE: NEGATIVE
OCCULT BLOOD: NEGATIVE
PH, URINE: 6 (ref 4.5–8)
PROTEIN (URINE DIPSTICK): NEGATIVE MG/DL
SPECIFIC GRAVITY: 1.01 (ref 1–1.03)
UROBILINOGEN,SEMI-QN: 0.2 MG/DL (ref 0–1.9)

## 2024-09-05 PROCEDURE — 99204 OFFICE O/P NEW MOD 45 MIN: CPT | Performed by: UROLOGY

## 2024-09-05 PROCEDURE — 81002 URINALYSIS NONAUTO W/O SCOPE: CPT | Performed by: UROLOGY

## 2024-09-05 NOTE — PROGRESS NOTES
[facility]  Initial Office Consultation    HPI:   Rafat Thurman is a 69 year old male here today for consultation at the request of, and a copy of this note will be sent to, Carlos Ramon MD.    1.  Increased PSA velocity  Patient reports family history of prostate cancer in his father.    He has been undergoing routine PSA screening through his PCP.  His PSA velocity was noted to be increased over the past year and a half where his PSA went from 1.28 ng/mL on 12/2/2022 to 2.89 ng/mL on 6/25/2024.    He has minimal LUTS with an IPSS of 1 and a quality-of-life score of 0.    Dipstick UA today was negative.      PAST MEDICAL HISTORY: Osteoarthritis.  Hypertension.  Hyperlipidemia.  Rosacea.    PAST SURGICAL HISTORY: Hip replacement surgery.  Varicocelectomy.    SOCIAL HISTORY: .  1 child.  No smoking or illicit drug use.  Social alcohol.  Retired and used to work for [employer].     Family History   Problem Relation Age of Onset    Heart Disorder Father     Heart Disorder Mother     Other (Pulmonary Embolism) Mother     Pulmonary Disease Sister     Other (MVA) Brother      Allergies: Dander      REVIEW OF SYSTEMS:  Pertinent positives and negatives per HPI. A 12-point ROS was performed and is otherwise negative.       EXAM:  BP (!) 166/91 (BP Location: Left arm, Patient Position: Sitting, Cuff Size: adult)   Pulse 105     Physical Exam  Constitutional:       Appearance: He is well-developed.   HENT:      Head: Normocephalic.   Eyes:      General: No scleral icterus.  Cardiovascular:      Rate and Rhythm: Normal rate.   Pulmonary:      Effort: Pulmonary effort is normal.   Genitourinary:     Penis: Circumcised.       Comments: ESPERANZA revealing 2+ enlarged prostate, smooth, symmetrical, nontender, nonnodular.  Skin:     General: Skin is warm and dry.   Neurological:      Mental Status: He is alert and oriented to person, place, and time.   Psychiatric:         Mood and Affect: Mood  normal.         Behavior: Behavior normal.       LABS:     PSA PSA Screen PSA, TOTAL   Latest Ref Rng 0.0 - 4.0 ng/mL <=4.00 ng/mL < OR = 4.00 ng/mL   2/10/2015 1.3      10/27/2016 1.2      12/28/2017 1.5      4/4/2019  1.51     8/18/2020  1.36     9/9/2021  1.35     12/2/2022   1.28    6/25/2024   2.89        IMAGING:  No results found.      IMPRESSION:  69 year old male undergoing routine prostate cancer screening by his PCP, noted to have increased PSA velocity.  Family history of prostate cancer in his father.  ESPERANZA nonsuspicious.    Findings reviewed with patient at length.  We discussed prostate cancer screening per guidelines including benefits and risks.    Discussed options of monitoring PSA with repeat in 3 to 6 months, obtaining a prostate MRI, or proceeding with a TRUS-guided prostate biopsy.    Benefits, risks, side effects, alternatives of these options were reviewed.  Patient agrees to repeat PSA in 4 months.  If PSA levels continue to rise, then we may consider obtaining prostate MRI for further evaluation.    All questions answered.      PLAN:  1.  Monitor PSA with repeat in 4 months and follow-up thereafter.  Continuously rising PSA levels would warrant further evaluation with a prostate MRI.    Mendez Goodwin MD  9/5/2024

## 2024-12-24 ENCOUNTER — TELEPHONE (OUTPATIENT)
Dept: INTERNAL MEDICINE CLINIC | Facility: CLINIC | Age: 69
End: 2024-12-24

## 2024-12-24 NOTE — TELEPHONE ENCOUNTER
Patient calling for appointment / antibiotic for cold.    Symptoms of cold since Saturday.  Found out yesterday that he was exposed to pneumonia about week ago.    Sinus congestion, dry cough, no tightness in chest,     Advil cold and sinus has been helping with clearing up head.    Patient is concerned he may have pneumonia, doesn't want to pass it on to others.    [facility]    Best call back number [phone]

## 2024-12-24 NOTE — TELEPHONE ENCOUNTER
Respiratory infection triage:    Fever:  [x]  No fever  []  Fever>100.4    Cough:  [] Tight cough  [] Cough with exertion  [x] Dry cough  [] Sputum production, Color:     Breathing:  [] Mild shortness of breath interfering with activity  [] Wheezing  [] Pain with deep breathing  [] Using inhaler    Other symptoms:  [] Sore throat  [] Difficulty swallowing  [] Nasal drainage/congestion  [x] Sinus congestion/pressure:postnasal drip  [] Ear pain  [] Body aches  [] Poor appetite  [] Loss of sense of smell   [] Loss of sense of taste  []Conjunctivitis?    [] Any recent travel?  [] Any sick contacts?  [] Are you a healthcare worker?        ADDITIONAL NOTES:  Called patient who started with symptoms Saturday - took advil cold and sinus which helped , but is concerned about pneumonia . RN advised to be evaluated at  - he will go to [facility] F/U 12/17          Notified patient that we will route this message to the doctor and see what their recommendations would be. In the meantime, if anything worsens, they were advised to call back or seek emergent evaluation.

## 2024-12-26 NOTE — TELEPHONE ENCOUNTER
Spoke with pt.  He did not report to UC.  Pt reports his symptoms are improving.  Afebrile.  Denies chest tightness, difficulty breathing, SOB, or wheezing.    Advised rest and fluids.   Advised pt call back with any questions/concerns/new or worsening symptoms. Pt verbalized understanding.

## 2024-12-27 ENCOUNTER — OFFICE VISIT (OUTPATIENT)
Dept: INTERNAL MEDICINE CLINIC | Facility: CLINIC | Age: 69
End: 2024-12-27

## 2024-12-27 VITALS
SYSTOLIC BLOOD PRESSURE: 156 MMHG | RESPIRATION RATE: 20 BRPM | TEMPERATURE: 97 F | DIASTOLIC BLOOD PRESSURE: 88 MMHG | BODY MASS INDEX: 32.73 KG/M2 | WEIGHT: 221 LBS | HEART RATE: 104 BPM | OXYGEN SATURATION: 97 % | HEIGHT: 69 IN

## 2024-12-27 DIAGNOSIS — R05.1 ACUTE COUGH: Primary | ICD-10-CM

## 2024-12-27 LAB
COVID19 BINAX NOW ANTIGEN: NOT DETECTED
OPERATOR ID: NORMAL

## 2024-12-27 PROCEDURE — 1159F MED LIST DOCD IN RCRD: CPT | Performed by: INTERNAL MEDICINE

## 2024-12-27 PROCEDURE — 1126F AMNT PAIN NOTED NONE PRSNT: CPT | Performed by: INTERNAL MEDICINE

## 2024-12-27 PROCEDURE — 99214 OFFICE O/P EST MOD 30 MIN: CPT | Performed by: INTERNAL MEDICINE

## 2024-12-27 RX ORDER — AMOXICILLIN 500 MG/1
500 TABLET, FILM COATED ORAL 2 TIMES DAILY
COMMUNITY
Start: 2024-12-12

## 2024-12-27 NOTE — PROGRESS NOTES
Rafat Thurman is a 69 year old male.  Chief Complaint   Patient presents with    Checkup     Reports non productive cough and cold sx X 1 week with discomfort to throat.  Denies exposure to covid       HPI:   Rafat Thurman is a 69 year old male who presents for: sick visit    Been sick x 1 week  First few days had sinus congestion  The past few days is cough-mostly nonproductive  No sob  No fever    One of the kids was dx with pneumonia so wants to be careful  He is feeling slightly better today    Wt Readings from Last 6 Encounters:   12/27/24 221 lb (100.2 kg)   05/31/24 213 lb 6.4 oz (96.8 kg)   08/14/23 216 lb (98 kg)   10/25/22 215 lb 12.8 oz (97.9 kg)   03/14/22 210 lb (95.3 kg)   08/30/21 215 lb (97.5 kg)     Body mass index is 32.64 kg/m².       Current Outpatient Medications   Medication Sig Dispense Refill    amoxicillin 500 MG Oral Tab Take 1 tablet (500 mg total) by mouth 2 (two) times daily.      ATORVASTATIN 20 MG Oral Tab TAKE 1 TABLET BY MOUTH EVERY DAY AT NIGHT 90 tablet 3    ENALAPRIL 10 MG Oral Tab TAKE 1 TABLET BY MOUTH EVERY DAY IN THE MORNING 90 tablet 3    aspirin 81 MG Oral Tab EC Take 1 tablet (81 mg total) by mouth daily.      TURMERIC OR Take by mouth daily.          Past Medical History:    Adenomatous colon polyp    High blood pressure    High cholesterol    Left groin pain    Nonspecific abnormal findings on radiological and examination of intrathoracic organs    Osteoarthritis    Osteoarthritis of left hip    Primary osteoarthritis of right hip    Rosacea    Sensorineural hearing loss, bilateral    Visual impairment    contacts      Past Surgical History:   Procedure Laterality Date    Colonoscopy  10/10, 6/07    Colonoscopy  03/2019    Colonoscopy  06/10/2022    Colonoscopy,biopsy N/A 6/16/2016    Procedure: COLONOSCOPY, POSSIBLE BIOPSY, POSSIBLE POLYPECTOMY 20816;  Surgeon: Richard Shah MD;  Location: [facility]    Excise varicocele      Total hip replacement  Left 04/30/2019    anterior       Family History   Problem Relation Age of Onset    Heart Disorder Mother     Other (Pulmonary Embolism) Mother     Heart Disorder Father     Prostate Cancer Father     Pulmonary Disease Sister     Other (MVA) Brother       Social History:   Social History     Socioeconomic History    Marital status:    Tobacco Use    Smoking status: Never    Smokeless tobacco: Never   Vaping Use    Vaping status: Never Used   Substance and Sexual Activity    Alcohol use: Yes     Comment: 2-3 drinks weekly    Drug use: No   Other Topics Concern    Caffeine Concern No    Pt has a pacemaker No    Pt has a defibrillator No    Reaction to local anesthetic No          REVIEW OF SYSTEMS:   GENERAL: feels well otherwise  SKIN: denies any unusual skin lesions  HEENT: denies nasal congestion, sinus pain, ST, sore throat, ear pain  LUNGS: denies shortness of breath with exertion, wheezing, or sputum production. +cough        EXAM:   /88   Pulse 104   Temp 97.4 °F (36.3 °C) (Oral)   Resp 20   Ht 5' 9\" (1.753 m)   Wt 221 lb (100.2 kg)   SpO2 97%   BMI 32.64 kg/m²     GENERAL: well developed, well nourished, in no apparent distress  HEENT: normal oropharynx without erythema or exudate, normal TM's, no sinus tenderness, nares patent  NECK: supple, no carotic bruits, no thyromegaly, no cervical or supraclavicular LAD  LUNGS: clear to auscultation bilaterally, no wheezing or rhonchi  CARDIO: RRR, normal S1S2, no gallops or murmurs      ASSESSMENT AND PLAN:     Viral uri  Suppress cough  Zyrtec  Flonase  Call with worsening sx      Allyson Sommer DO  12/27/2024  1:14 PM

## 2025-02-05 RX ORDER — ENALAPRIL MALEATE 10 MG/1
10 TABLET ORAL EVERY MORNING
Qty: 90 TABLET | Refills: 3 | Status: CANCELLED | OUTPATIENT
Start: 2025-02-05

## 2025-02-06 ENCOUNTER — OFFICE VISIT (OUTPATIENT)
Dept: INTERNAL MEDICINE CLINIC | Facility: CLINIC | Age: 70
End: 2025-02-06

## 2025-02-06 VITALS
HEIGHT: 68.8 IN | SYSTOLIC BLOOD PRESSURE: 126 MMHG | BODY MASS INDEX: 31.2 KG/M2 | WEIGHT: 210.63 LBS | HEART RATE: 94 BPM | TEMPERATURE: 98 F | OXYGEN SATURATION: 100 % | DIASTOLIC BLOOD PRESSURE: 66 MMHG

## 2025-02-06 DIAGNOSIS — B34.9 VIRAL ILLNESS: ICD-10-CM

## 2025-02-06 DIAGNOSIS — Z00.00 MEDICARE ANNUAL WELLNESS VISIT, SUBSEQUENT: Primary | ICD-10-CM

## 2025-02-06 DIAGNOSIS — R97.20 RISING PSA LEVEL: ICD-10-CM

## 2025-02-06 DIAGNOSIS — Z00.00 ROUTINE HEALTH MAINTENANCE: ICD-10-CM

## 2025-02-06 DIAGNOSIS — I10 ESSENTIAL HYPERTENSION: ICD-10-CM

## 2025-02-06 DIAGNOSIS — E78.5 HYPERLIPIDEMIA, UNSPECIFIED HYPERLIPIDEMIA TYPE: ICD-10-CM

## 2025-02-06 DIAGNOSIS — Z86.0101 HX OF ADENOMATOUS COLONIC POLYPS: ICD-10-CM

## 2025-02-06 NOTE — PROGRESS NOTES
Rafat Thurman is a 69 year old male.  HPI:     Chief Complaint   Patient presents with    Physical     MA  Reviewed Preventative/Wellness form with patient.       Rafat presents for Medicare annual wellness visit.    He feels generally well.  Sunday he had nasal congestion.  Monday he was fatigued.  He felt like he had a head cold.  He now feels better and almost 100% better.  We talked about the possibility of COVID.  He is out of the window of needing any treatment but I thought it might be wise to do a COVID test as he is exposed to other people.    He knows he is due for colonoscopy.  He has had a change in insurance.  He does know to contact Dr. Barrera.  Last colonoscopy August 2022.  He had 9 polyps and a 2-year follow-up was recommended I August 2024    He has seen Dr. Goodwin from urology.  Last PSA was 2.89 and Dr. Goodwin has an order for repeat and I did give him order.  He will get other labs from me with CBC CMP and lipids.  COVID test was done and was negative.    He is eligible for PCV 20 vaccine but will not give today since he is recovering from a URI.  He has had flu vaccine.  He has not had the COVID booster.  Will update EKG.    We did talk about his blood pressure being good today.  His home machine tends to read a higher diastolic in home machine today read 129/88    Last EKG August 2023 showed incomplete right bundle branch block    His weight today is 210 pounds.  December 2024 he was 221 pounds.  May 2024 he was 213 pounds.  This weight loss is intentional.  When he saw the weight of 221 pounds he decreased snacks.  Decreased alcohol.  So weight loss is intentional.  He has no alarm symptoms.      Current Outpatient Medications   Medication Sig Dispense Refill    ATORVASTATIN 20 MG Oral Tab TAKE 1 TABLET BY MOUTH EVERY DAY AT NIGHT 90 tablet 3    ENALAPRIL 10 MG Oral Tab TAKE 1 TABLET BY MOUTH EVERY DAY IN THE MORNING 90 tablet 3    aspirin 81 MG Oral Tab EC Take 1 tablet (81 mg total)  by mouth daily.      TURMERIC OR Take by mouth daily.        amoxicillin 500 MG Oral Tab Take 1 tablet (500 mg total) by mouth 2 (two) times daily. (Patient not taking: Reported on 2/6/2025)        Past Medical History:    Adenomatous colon polyp    High blood pressure    High cholesterol    Left groin pain    Nonspecific abnormal findings on radiological and examination of intrathoracic organs    Osteoarthritis    Osteoarthritis of left hip    Primary osteoarthritis of right hip    Rosacea    Sensorineural hearing loss, bilateral    Visual impairment    contacts      Social History:  Social History     Socioeconomic History    Marital status:    Tobacco Use    Smoking status: Never     Passive exposure: Never    Smokeless tobacco: Never   Vaping Use    Vaping status: Never Used   Substance and Sexual Activity    Alcohol use: Yes     Comment: 2-3 drinks weekly    Drug use: No   Other Topics Concern    Caffeine Concern No    Pt has a pacemaker No    Pt has a defibrillator No    Reaction to local anesthetic No     Social Drivers of Health     Food Insecurity: No Food Insecurity (2/6/2025)    NCSS - Food Insecurity     Worried About Running Out of Food in the Last Year: No     Ran Out of Food in the Last Year: No   Transportation Needs: No Transportation Needs (2/6/2025)    NCSS - Transportation     Lack of Transportation: No   Housing Stability: Not At Risk (2/6/2025)    NCSS - Housing/Utilities     Has Housing: Yes     Worried About Losing Housing: No     Unable to Get Utilities: No        REVIEW OF SYSTEMS:   GENERAL HEALTH:  feels well otherwise  RESPIRATORY:  Voices no shortness of breath with exertion or cough acutely.  URI symptoms resolved.  CARDIOVASCULAR:  Voices no chest pain on exertion or shortness of breath  GI:   Voices no abdominal pain or changes of bowels   :Viices no urning or frequency of urination.  NEURO:  Voices no  headaches or dizziness    EXAM:   /66 (BP Location: Right arm,  Patient Position: Sitting, Cuff Size: large)   Pulse 94   Temp 98.1 °F (36.7 °C) (Oral)   Ht 5' 8.8\" (1.748 m)   Wt 210 lb 9.6 oz (95.5 kg)   SpO2 100%   BMI 31.28 kg/m²     GENERAL:  well developed, well nourished, in no apparent distress  SKIN:  no rashes ,   HEENT: atraumatic.   Pharynx normal without exudate.  EYES:  PERRL. Sclera anicteric.  NECK:  Supple,  no adenopathy,  thyroid normal  LUNGS:  clear to auscultation.  Effort normal  CARDIO:  RRR without murmur.   S1 and S2 normal  GI:  good BS's,  no masses,   HSM or tenderness  EXTREMITIES : no cyanosis, clubbing or edema      General Health     In the past six months, have you lost more than 10 pounds without trying?: 2 - No    Has your appetite been poor?: No    Type of Diet: Balanced    How does the patient maintain a good energy level?: Appropriate Exercise    How would you describe your daily physical activity?: Moderate    How would you describe your current health state?: Good    How do you maintain positive mental well-being?: Social Interaction;Visiting Friends;Visiting Family         Have you had any immunizations at another office such as Influenza, Hepatitis B, Tetanus, or Pneumococcal?: No     Functional Ability     Bathing or Showering: Able without help    Toileting: Able without help    Dressing: Able without help    Eating: Able without help    Driving: Able without help    Preparing your meals: Able without help    Managing money/bills: Able without help    Taking medications as prescribed: Able without help    Are you able to afford your medications?: Yes    Hearing Problems?: Yes (wears hearing aids)     Functional Status     Hearing Problems?: Yes (wears hearing aids)    Vision Problems? : No    Difficulty walking?: No    Difficulty dressing or bathing?: No    Problems with daily activities? : No    Memory Problems?: No      Fall/Risk Assessment                                                              Depression Screening  (PHQ-2/PHQ-9): Over the LAST 2 WEEKS                      Advance Directives     Do you have a healthcare power of ?: Yes    Do you have a living will?: Yes     Hearing Assessment (Required for AWV/SWV)      Hearing Screening    Time taken: 2/6/2025 10:06 AM  Entry User: Medina Birch RN  Screening Method: Other  Other: wears hearing aids         Visual Acuity     Right Eye Visual Acuity: Uncorrected Left Eye Visual Acuity: Uncorrected   Right Eye Chart Acuity: 20/20 Left Eye Chart Acuity: 20/50     Cognitive Assessment     What day of the week is this?: Correct    What month is it?: Correct    What year is it?: Correct    Recall \"Ball\": Correct    Recall \"Flag\": Correct    Recall \"Tree\": Correct         Rafat Thurman's SCREENING SCHEDULE   Tests on this list are recommended by your physician but may not be covered, or covered at this frequency, by your insurer. Please check with your insurance carrier before scheduling to verify coverage.    PREVENTATIVE SERVICES  INDICATIONS AND SCHEDULE Internal Lab or Procedure External Lab or Procedure   Diabetes Screening      HbgA1C   Annually No results found for: \"A1C\"      No data to display                Fasting Blood Sugar (FSB) Annually GLUCOSE (mg/dL)   Date Value   06/25/2024 95       Cardiovascular Disease Screening     LDL Annually LDL-CHOLESTEROL (mg/dL (calc))   Date Value   06/25/2024 79        EKG One Time     Colorectal Cancer Screening      Colonoscopy Screen every 10 years Health Maintenance   Topic Date Due    Colorectal Cancer Screening  03/21/2022    Update Health Maintenance if applicable    Flex Sigmoidoscopy Screen every 5 years No results found for this or any previous visit.      No data to display                 Fecal Occult Blood Annually No results found for: \"FOB\", \"OCCULTSTOOL\"      No data to display                Glaucoma Screening      Ophthalmology Visit Annually     Prostate Cancer Screening      PSA  Annually Health  Maintenance   Topic Date Due    PSA  06/25/2026     Update Health Maintenance if applicable   Immunizations      Influenza No orders found for this or any previous visit. Update Immunization Activity if applicable    Pneumococcal No orders found for this or any previous visit. Update Immunization Activity if applicable    Hepatitis B No orders found for this or any previous visit. Update Immunization Activity if applicable    Tetanus No orders found for this or any previous visit. Update Immunization Activity if applicable    Zoster (Not covered by Medicare Part B) No orders found for this or any previous visit. Update Immunization Activity if applicable     SPECIFIC DISEASE MONITORING Internal Lab or Procedure External Lab or Procedure   Annual Monitoring of Persistent     Medications (ACE/ARB, digoxin, diuretics)    Potassium  Annually POTASSIUM (mmol/L)   Date Value   06/25/2024 4.7         No data to display                Creatinine  Annually CREATININE (mg/dL)   Date Value   06/25/2024 0.99         No data to display                Digoxin Serum Conc  Annually No results found for: \"DIGOXIN\"      No data to display                Diabetes      HgbA1C  Annually No results found for: \"A1C\"      No data to display                Creat/alb ratio  Annually      LDL  Annually LDL-CHOLESTEROL (mg/dL (calc))   Date Value   06/25/2024 79         No data to display                 Dilated Eye exam  Annually      No data to display                   No data to display                COPD      Spirometry Testing Annually No results found for this or any previous visit.      No data to display                     ASSESSMENT AND PLAN:     1. Medicare annual wellness visit, subsequent  Medicare annual wellness visit    2. Essential hypertension  Blood pressure under good control.  His blood pressure machine at home read a 129 over there is a little bit of discrepancy in the diastolic reading but today's reading is  acceptable.  - CBC With Differential With Platelet  - Comp Metabolic Panel (14)  - Lipid Panel  - EKG 12 Lead [facility] at [facility]; Future    3. Rising PSA level  Given Dr. Goodwin's order for PSA.  He had visit with Dr. Goodwin September 5, 2024.  At that time his exam showed ESPERANZA revealing 2+ enlarged prostate, smooth, symmetrical, nonnodular and nontender.    4. Hyperlipidemia, unspecified hyperlipidemia type  Will update lipids.  On statin.    5. Routine health maintenance  See above for vaccine discussion.    6. Hx of adenomatous colonic polyps  I did give him Dr. Barrera's contact information.  He knows he is due for colonoscopy.    7. Viral illness  Resolved.  Non-COVID.    This visit was 30 minutes.  I spent 10 minutes before visit preparing and reviewing old records.  Greater than 50% of the visit was engaged in counseling and review of past data.    Problem list as noted in electronic health record reviewed.  Stable.  Will be followed.    Follow-up with new PCP in 6 months.    The patient indicates understanding of these issues and agrees to the plan.    Carlos Ramon MD  2/6/2025  10:31 AM

## 2025-03-04 ENCOUNTER — TELEPHONE (OUTPATIENT)
Dept: INTERNAL MEDICINE CLINIC | Facility: CLINIC | Age: 70
End: 2025-03-04

## 2025-03-04 ENCOUNTER — LAB ENCOUNTER (OUTPATIENT)
Dept: LAB | Age: 70
End: 2025-03-04
Attending: INTERNAL MEDICINE
Payer: MEDICARE

## 2025-03-04 DIAGNOSIS — I10 ESSENTIAL HYPERTENSION: ICD-10-CM

## 2025-03-04 LAB
ALBUMIN SERPL-MCNC: 4.6 G/DL (ref 3.2–4.8)
ALBUMIN/GLOB SERPL: 1.6 {RATIO} (ref 1–2)
ALP LIVER SERPL-CCNC: 59 U/L
ALT SERPL-CCNC: 23 U/L
ANION GAP SERPL CALC-SCNC: 9 MMOL/L (ref 0–18)
AST SERPL-CCNC: 23 U/L (ref ?–34)
ATRIAL RATE: 85 BPM
BASOPHILS # BLD AUTO: 0.04 X10(3) UL (ref 0–0.2)
BASOPHILS NFR BLD AUTO: 0.6 %
BILIRUB SERPL-MCNC: 0.6 MG/DL (ref 0.2–1.1)
BUN BLD-MCNC: 12 MG/DL (ref 9–23)
BUN/CREAT SERPL: 11.5 (ref 10–20)
CALCIUM BLD-MCNC: 9.1 MG/DL (ref 8.7–10.4)
CHLORIDE SERPL-SCNC: 105 MMOL/L (ref 98–112)
CHOLEST SERPL-MCNC: 154 MG/DL (ref ?–200)
CO2 SERPL-SCNC: 26 MMOL/L (ref 21–32)
CREAT BLD-MCNC: 1.04 MG/DL
DEPRECATED RDW RBC AUTO: 42.9 FL (ref 35.1–46.3)
EGFRCR SERPLBLD CKD-EPI 2021: 78 ML/MIN/1.73M2 (ref 60–?)
EOSINOPHIL # BLD AUTO: 0.22 X10(3) UL (ref 0–0.7)
EOSINOPHIL NFR BLD AUTO: 3 %
ERYTHROCYTE [DISTWIDTH] IN BLOOD BY AUTOMATED COUNT: 13.2 % (ref 11–15)
FASTING PATIENT LIPID ANSWER: YES
FASTING STATUS PATIENT QL REPORTED: YES
GLOBULIN PLAS-MCNC: 2.8 G/DL (ref 2–3.5)
GLUCOSE BLD-MCNC: 93 MG/DL (ref 70–99)
HCT VFR BLD AUTO: 40.9 %
HDLC SERPL-MCNC: 56 MG/DL (ref 40–59)
HGB BLD-MCNC: 13.6 G/DL
IMM GRANULOCYTES # BLD AUTO: 0.05 X10(3) UL (ref 0–1)
IMM GRANULOCYTES NFR BLD: 0.7 %
LDLC SERPL CALC-MCNC: 82 MG/DL (ref ?–100)
LYMPHOCYTES # BLD AUTO: 2.16 X10(3) UL (ref 1–4)
LYMPHOCYTES NFR BLD AUTO: 29.9 %
MCH RBC QN AUTO: 29.3 PG (ref 26–34)
MCHC RBC AUTO-ENTMCNC: 33.3 G/DL (ref 31–37)
MCV RBC AUTO: 88.1 FL
MONOCYTES # BLD AUTO: 0.71 X10(3) UL (ref 0.1–1)
MONOCYTES NFR BLD AUTO: 9.8 %
NEUTROPHILS # BLD AUTO: 4.04 X10 (3) UL (ref 1.5–7.7)
NEUTROPHILS # BLD AUTO: 4.04 X10(3) UL (ref 1.5–7.7)
NEUTROPHILS NFR BLD AUTO: 56 %
NONHDLC SERPL-MCNC: 98 MG/DL (ref ?–130)
OSMOLALITY SERPL CALC.SUM OF ELEC: 289 MOSM/KG (ref 275–295)
P AXIS: 73 DEGREES
P-R INTERVAL: 180 MS
PLATELET # BLD AUTO: 291 10(3)UL (ref 150–450)
POTASSIUM SERPL-SCNC: 4.5 MMOL/L (ref 3.5–5.1)
PROT SERPL-MCNC: 7.4 G/DL (ref 5.7–8.2)
Q-T INTERVAL: 384 MS
QRS DURATION: 90 MS
QTC CALCULATION (BEZET): 456 MS
R AXIS: 56 DEGREES
RBC # BLD AUTO: 4.64 X10(6)UL
SODIUM SERPL-SCNC: 140 MMOL/L (ref 136–145)
T AXIS: 42 DEGREES
TRIGL SERPL-MCNC: 86 MG/DL (ref 30–149)
VENTRICULAR RATE: 85 BPM
VLDLC SERPL CALC-MCNC: 14 MG/DL (ref 0–30)
WBC # BLD AUTO: 7.2 X10(3) UL (ref 4–11)

## 2025-03-04 PROCEDURE — 80053 COMPREHEN METABOLIC PANEL: CPT | Performed by: INTERNAL MEDICINE

## 2025-03-04 PROCEDURE — 93010 ELECTROCARDIOGRAM REPORT: CPT | Performed by: STUDENT IN AN ORGANIZED HEALTH CARE EDUCATION/TRAINING PROGRAM

## 2025-03-04 PROCEDURE — 80061 LIPID PANEL: CPT | Performed by: INTERNAL MEDICINE

## 2025-03-04 PROCEDURE — 93005 ELECTROCARDIOGRAM TRACING: CPT

## 2025-03-04 PROCEDURE — 36415 COLL VENOUS BLD VENIPUNCTURE: CPT | Performed by: INTERNAL MEDICINE

## 2025-03-04 PROCEDURE — 85025 COMPLETE CBC W/AUTO DIFF WBC: CPT | Performed by: INTERNAL MEDICINE

## 2025-03-04 NOTE — TELEPHONE ENCOUNTER
We can let Rafat know that I thought his EKG and lab test results came out good.  No changes needed.

## 2025-03-05 RX ORDER — ATORVASTATIN CALCIUM 20 MG/1
20 TABLET, FILM COATED ORAL NIGHTLY
Qty: 90 TABLET | Refills: 3 | Status: CANCELLED | OUTPATIENT
Start: 2025-03-05

## 2025-03-05 RX ORDER — ENALAPRIL MALEATE 10 MG/1
10 TABLET ORAL EVERY MORNING
Qty: 90 TABLET | Refills: 3 | Status: SHIPPED | OUTPATIENT
Start: 2025-03-05

## 2025-03-05 NOTE — TELEPHONE ENCOUNTER
Atorvastatin 1 yr ordered on 7/8/2024      Refill request is for a maintenance medication and has met the criteria specified in the Ambulatory Medication Refill Standing Order for eligibility, visits, laboratory, alerts and was sent to the requested pharmacy.    Requested Prescriptions     Signed Prescriptions Disp Refills    enalapril 10 MG Oral Tab 90 tablet 3     Sig: Take 1 tablet (10 mg total) by mouth every morning.     Authorizing Provider: ANA ROSA GIBSON     Ordering User: PEDRO [email or web address]

## 2025-03-06 NOTE — TELEPHONE ENCOUNTER
Please let Rafat know I apologize.  There is already a PSA order from Dr. Goodwin the urologist he saw in September.  Per Dr. Goodwin's note he was to have a PSA right about this time.    I apologize, I probably did not let him know that there was an order from Dr. Goodwin already in place to get at the same time he got my labs.    He can go anytime and the results will go to Dr. Goodwin.

## 2025-03-24 ENCOUNTER — TELEPHONE (OUTPATIENT)
Dept: SURGERY | Facility: CLINIC | Age: 70
End: 2025-03-24

## 2025-03-24 NOTE — TELEPHONE ENCOUNTER
[facility] is able to see labwork ordered by Dr. Goodwin. Patient was called and verified name and , patient was notified and her verbally understood. I told patient test was ordered 2024 by Dr. Goodwin and as long as he shows up before expiration date he should be able to complete exam.  He verbally agreed.

## 2025-03-24 NOTE — TELEPHONE ENCOUNTER
Per patient calling asking if his order for a PSA blood test could be sent to the [address] [place] [address] location instead. Please advise.

## 2025-03-25 ENCOUNTER — TELEPHONE (OUTPATIENT)
Dept: INTERNAL MEDICINE CLINIC | Facility: CLINIC | Age: 70
End: 2025-03-25

## 2025-03-25 NOTE — TELEPHONE ENCOUNTER
Please let Rafat know that I did receive a message from Dr. Barrera regarding his colonoscopy.  Dr. Barrera noted 11 polyps.  He recommended colonoscopy in 1 year which would be March 2026    The reason for this message is to ask him to put on his calendar that his colonoscopy will be due next year March.    Since I will be retiring June 13 of this year I did not want this recommendation from Dr. Barrera to be forgotten about.

## 2025-03-27 ENCOUNTER — LAB ENCOUNTER (OUTPATIENT)
Dept: LAB | Age: 70
End: 2025-03-27
Attending: UROLOGY
Payer: MEDICARE

## 2025-03-27 LAB — PSA SERPL-MCNC: 1.33 NG/ML (ref ?–4)

## 2025-03-27 PROCEDURE — 84153 ASSAY OF PSA TOTAL: CPT | Performed by: UROLOGY

## 2025-03-27 PROCEDURE — 36415 COLL VENOUS BLD VENIPUNCTURE: CPT | Performed by: UROLOGY

## (undated) DIAGNOSIS — I10 ESSENTIAL HYPERTENSION: Primary | ICD-10-CM

## (undated) DIAGNOSIS — E78.5 HYPERLIPIDEMIA, UNSPECIFIED HYPERLIPIDEMIA TYPE: ICD-10-CM

## (undated) DEVICE — 2DE14 2-0 PDO 24 X 24: Brand: 2DE14 2-0 PDO 24 X 24

## (undated) DEVICE — GAUZE SPONGES,12 PLY: Brand: CURITY

## (undated) DEVICE — TRAY FOLEY BDX 16F STATLOCK

## (undated) DEVICE — VIOLET BRAIDED (POLYGLACTIN 910), SYNTHETIC ABSORBABLE SUTURE: Brand: COATED VICRYL

## (undated) DEVICE — ANTERIOR HIP: Brand: MEDLINE INDUSTRIES, INC.

## (undated) DEVICE — PROXIMATE SKIN STAPLERS (35 WIDE) CONTAINS 35 STAINLESS STEEL STAPLES (FIXED HEAD): Brand: PROXIMATE

## (undated) DEVICE — PHOTONSABER Y METAL TIP

## (undated) DEVICE — SUTURE VICRYL 2-0 FS-1

## (undated) DEVICE — 3M™ STERI-STRIP™ REINFORCED ADHESIVE SKIN CLOSURES, R1547, 1/2 IN X 4 IN (12 MM X 100 MM), 6 STRIPS/ENVELOPE: Brand: 3M™ STERI-STRIP™

## (undated) DEVICE — SOL  .9 1000ML BTL

## (undated) DEVICE — BATTERY

## (undated) DEVICE — T5 HOOD WITH PEEL AWAY FACE SHIELD

## (undated) DEVICE — UNIT THERA PREVENA 45ML

## (undated) DEVICE — GAMMEX® NON-LATEX PI ORTHO SIZE 9, STERILE POLYISOPRENE POWDER-FREE SURGICAL GLOVE: Brand: GAMMEX

## (undated) DEVICE — PREVENA PEEL & PLACE SYSTEM KIT- 13 CM: Brand: PREVENA™ PEEL & PLACE™

## (undated) DEVICE — GOWN SURG AERO BLUE PERF XLG

## (undated) DEVICE — BIPOLAR SEALER 23-112-1 AQM 6.0: Brand: AQUAMANTYS™

## (undated) DEVICE — 2T11 #2 PDO 36 X 36: Brand: 2T11 #2 PDO 36 X 36

## (undated) DEVICE — SUCTION CANISTER, 3000CC,SAFELINER: Brand: DEROYAL

## (undated) DEVICE — GAMMEX® NON-LATEX PI ORTHO SIZE 8.5, STERILE POLYISOPRENE POWDER-FREE SURGICAL GLOVE: Brand: GAMMEX

## (undated) DEVICE — BLADE SAW SAGITTAL 19.5

## (undated) DEVICE — CHLORAPREP 26ML APPLICATOR

## (undated) DEVICE — GAMMEX® PI HYBRID SIZE 7.5, STERILE POWDER-FREE SURGICAL GLOVE, POLYISOPRENE AND NEOPRENE BLEND: Brand: GAMMEX

## (undated) DEVICE — DRAPE SHEET LG

## (undated) DEVICE — GAMMEX® PI HYBRID SIZE 9, STERILE POWDER-FREE SURGICAL GLOVE, POLYISOPRENE AND NEOPRENE BLEND: Brand: GAMMEX

## (undated) DEVICE — HOOD: Brand: FLYTE

## (undated) DEVICE — Device: Brand: STABLECUT®

## (undated) NOTE — LETTER
No referring provider defined for this encounter. 06/14/21        Patient: Kinza Elizabeth   YOB: 1955   Date of Visit: 6/14/2021       Dear  Dr. Mauricio Hunter MD,      Thank you for referring Kinza Elizabeth to my practice.   Please

## (undated) NOTE — LETTER
Hospital Discharge Documentation  Please phone to schedule a hospital follow up appointment.     From: [address] Hospitalist's Office  Phone: [phone]    Patient discharged time/date: 12/2/2020  3:51 PM  Patient discharge disposition:  Home or Self HEENT:  Normocephalic, atraumatic  Neck:  Supple, symmetrical  Cardiac:  Regular rate, regular rhythm  Pulmonary:  Clear to auscultation bilaterally, respirations unlabored  Gastrointestinal:  Soft, non-tender, normal bowel sounds  Extremities:  No edema, These medications were sent to [id number] [place] [place] - [address] [facility], [phone], [address] [place], [place] [address], [facility] Sabiha Campos [address]    Hours: 24-hours Phone: [phone]   · aspirin 325 MG Tabs  · celecoxib 200 MG

## (undated) NOTE — LETTER
Hospital Discharge Documentation  Please phone to schedule a hospital follow up appointment.     From: [address] Hospitalist's Office  Phone: [phone]    Patient discharged time/date: 5/1/2019  2:33 PM  Patient discharge disposition:  Home or Self LE:  No c/c/e. Left hip wound c/d/i with wound vac in place. Neuro:  nonfocal      Reason for Admission:    Hospital Course:     Osteoarthritis of left hip  Pt admitted after LEFT HIP ANTERIOR APPROACH ARTHROPLASTY. Pt tolerated procedure well.   Pain c Instructions Prescription details   Clindamycin Phosphate 1 % Swab  Commonly known as:  CLEOCIN T      APPLY 1 APPLICATION TOPICALLY DAILY.  USE AFTER SHAVING   Refills:  3     Enalapril Maleate 10 MG Tabs  Commonly known as:  VASOTEC      TAKE 1 TABLET (

## (undated) NOTE — MR AVS SNAPSHOT
[facility] [place]  [facility] [address] [address]  [phone]               Thank you for choosing us for your health care visit with Casey Canseco MD.  We are glad to serve you and happy to provide you with this summary of your visit.   Please weekend appointments for your exam are available. Walk-in patients are welcome for most exams. [facility]  [facility] Parking) ([address] Parking)  [address]   [address] [employer] for assistance with your inactive [employer] account    If you have questions, you can call [phone] to talk to our [facility] Staff. Remember, [employer] is NOT to be used for urgent needs. For medical emergencies, dial 911.     Vi